# Patient Record
Sex: MALE | Employment: OTHER | URBAN - METROPOLITAN AREA
[De-identification: names, ages, dates, MRNs, and addresses within clinical notes are randomized per-mention and may not be internally consistent; named-entity substitution may affect disease eponyms.]

---

## 2021-08-14 ENCOUNTER — APPOINTMENT (OUTPATIENT)
Dept: GENERAL RADIOLOGY | Age: 81
End: 2021-08-14
Payer: MEDICARE

## 2021-08-14 ENCOUNTER — APPOINTMENT (OUTPATIENT)
Dept: CT IMAGING | Age: 81
End: 2021-08-14
Payer: MEDICARE

## 2021-08-14 ENCOUNTER — HOSPITAL ENCOUNTER (OUTPATIENT)
Age: 81
Setting detail: OBSERVATION
Discharge: HOME | End: 2021-08-16
Attending: STUDENT IN AN ORGANIZED HEALTH CARE EDUCATION/TRAINING PROGRAM | Admitting: INTERNAL MEDICINE
Payer: MEDICARE

## 2021-08-14 DIAGNOSIS — R55 SYNCOPE AND COLLAPSE: Primary | ICD-10-CM

## 2021-08-14 DIAGNOSIS — R77.8 ELEVATED TROPONIN: ICD-10-CM

## 2021-08-14 DIAGNOSIS — Z86.73 HISTORY OF CVA (CEREBROVASCULAR ACCIDENT): ICD-10-CM

## 2021-08-14 PROBLEM — R79.89 ELEVATED TROPONIN: Status: ACTIVE | Noted: 2021-08-14

## 2021-08-14 PROBLEM — Z86.79 HISTORY OF HYPERTENSION: Status: ACTIVE | Noted: 2021-08-14

## 2021-08-14 PROBLEM — R60.0 LOWER EXTREMITY EDEMA: Status: ACTIVE | Noted: 2021-08-14

## 2021-08-14 LAB
ALBUMIN SERPL-MCNC: 3.8 G/DL (ref 3.5–5.2)
ALP BLD-CCNC: 61 U/L (ref 40–129)
ALT SERPL-CCNC: 22 U/L (ref 0–40)
AMORPHOUS: PRESENT
ANION GAP SERPL CALCULATED.3IONS-SCNC: 7 MMOL/L (ref 7–16)
AST SERPL-CCNC: 22 U/L (ref 0–39)
BACTERIA: ABNORMAL /HPF
BASOPHILS ABSOLUTE: 0 E9/L (ref 0–0.2)
BASOPHILS RELATIVE PERCENT: 0.2 % (ref 0–2)
BILIRUB SERPL-MCNC: 0.4 MG/DL (ref 0–1.2)
BILIRUBIN URINE: NEGATIVE
BLOOD, URINE: NEGATIVE
BUN BLDV-MCNC: 15 MG/DL (ref 6–23)
BURR CELLS: ABNORMAL
CALCIUM SERPL-MCNC: 9 MG/DL (ref 8.6–10.2)
CHLORIDE BLD-SCNC: 105 MMOL/L (ref 98–107)
CLARITY: CLEAR
CO2: 30 MMOL/L (ref 22–29)
COLOR: YELLOW
CREAT SERPL-MCNC: 0.8 MG/DL (ref 0.7–1.2)
EOSINOPHILS ABSOLUTE: 0 E9/L (ref 0.05–0.5)
EOSINOPHILS RELATIVE PERCENT: 0.3 % (ref 0–6)
GFR AFRICAN AMERICAN: >60
GFR NON-AFRICAN AMERICAN: >60 ML/MIN/1.73
GLUCOSE BLD-MCNC: 113 MG/DL (ref 74–99)
GLUCOSE URINE: 100 MG/DL
HCT VFR BLD CALC: 46.7 % (ref 37–54)
HEMOGLOBIN: 15 G/DL (ref 12.5–16.5)
KETONES, URINE: NEGATIVE MG/DL
LEUKOCYTE ESTERASE, URINE: NEGATIVE
LYMPHOCYTES ABSOLUTE: 0.67 E9/L (ref 1.5–4)
LYMPHOCYTES RELATIVE PERCENT: 7 % (ref 20–42)
MCH RBC QN AUTO: 28.7 PG (ref 26–35)
MCHC RBC AUTO-ENTMCNC: 32.1 % (ref 32–34.5)
MCV RBC AUTO: 89.5 FL (ref 80–99.9)
MONOCYTES ABSOLUTE: 0.48 E9/L (ref 0.1–0.95)
MONOCYTES RELATIVE PERCENT: 5.2 % (ref 2–12)
NEUTROPHILS ABSOLUTE: 8.36 E9/L (ref 1.8–7.3)
NEUTROPHILS RELATIVE PERCENT: 87.8 % (ref 43–80)
NITRITE, URINE: NEGATIVE
PDW BLD-RTO: 14.4 FL (ref 11.5–15)
PH UA: 8 (ref 5–9)
PLATELET # BLD: 193 E9/L (ref 130–450)
PMV BLD AUTO: 9.8 FL (ref 7–12)
POIKILOCYTES: ABNORMAL
POTASSIUM REFLEX MAGNESIUM: 4.7 MMOL/L (ref 3.5–5)
PRO-BNP: 146 PG/ML (ref 0–450)
PROTEIN UA: NEGATIVE MG/DL
RBC # BLD: 5.22 E12/L (ref 3.8–5.8)
RBC UA: ABNORMAL /HPF (ref 0–2)
SODIUM BLD-SCNC: 142 MMOL/L (ref 132–146)
SPECIFIC GRAVITY UA: 1.02 (ref 1–1.03)
TOTAL PROTEIN: 6.3 G/DL (ref 6.4–8.3)
TROPONIN, HIGH SENSITIVITY: 18 NG/L (ref 0–11)
TROPONIN, HIGH SENSITIVITY: 18 NG/L (ref 0–11)
UROBILINOGEN, URINE: 0.2 E.U./DL
WBC # BLD: 9.5 E9/L (ref 4.5–11.5)
WBC UA: ABNORMAL /HPF (ref 0–5)

## 2021-08-14 PROCEDURE — 71275 CT ANGIOGRAPHY CHEST: CPT

## 2021-08-14 PROCEDURE — 72170 X-RAY EXAM OF PELVIS: CPT

## 2021-08-14 PROCEDURE — 83880 ASSAY OF NATRIURETIC PEPTIDE: CPT

## 2021-08-14 PROCEDURE — 72125 CT NECK SPINE W/O DYE: CPT

## 2021-08-14 PROCEDURE — 81001 URINALYSIS AUTO W/SCOPE: CPT

## 2021-08-14 PROCEDURE — 93005 ELECTROCARDIOGRAM TRACING: CPT | Performed by: STUDENT IN AN ORGANIZED HEALTH CARE EDUCATION/TRAINING PROGRAM

## 2021-08-14 PROCEDURE — 80053 COMPREHEN METABOLIC PANEL: CPT

## 2021-08-14 PROCEDURE — G0378 HOSPITAL OBSERVATION PER HR: HCPCS

## 2021-08-14 PROCEDURE — 71045 X-RAY EXAM CHEST 1 VIEW: CPT

## 2021-08-14 PROCEDURE — 70450 CT HEAD/BRAIN W/O DYE: CPT

## 2021-08-14 PROCEDURE — 36415 COLL VENOUS BLD VENIPUNCTURE: CPT

## 2021-08-14 PROCEDURE — 99284 EMERGENCY DEPT VISIT MOD MDM: CPT

## 2021-08-14 PROCEDURE — 6360000004 HC RX CONTRAST MEDICATION: Performed by: RADIOLOGY

## 2021-08-14 PROCEDURE — 84484 ASSAY OF TROPONIN QUANT: CPT

## 2021-08-14 PROCEDURE — 85025 COMPLETE CBC W/AUTO DIFF WBC: CPT

## 2021-08-14 RX ORDER — ACETAMINOPHEN 650 MG/1
650 SUPPOSITORY RECTAL EVERY 6 HOURS PRN
Status: DISCONTINUED | OUTPATIENT
Start: 2021-08-14 | End: 2021-08-16 | Stop reason: HOSPADM

## 2021-08-14 RX ORDER — ACETAMINOPHEN 325 MG/1
650 TABLET ORAL EVERY 6 HOURS PRN
Status: DISCONTINUED | OUTPATIENT
Start: 2021-08-14 | End: 2021-08-16 | Stop reason: HOSPADM

## 2021-08-14 RX ORDER — SODIUM CHLORIDE 9 MG/ML
25 INJECTION, SOLUTION INTRAVENOUS PRN
Status: DISCONTINUED | OUTPATIENT
Start: 2021-08-14 | End: 2021-08-16 | Stop reason: HOSPADM

## 2021-08-14 RX ORDER — POLYETHYLENE GLYCOL 3350 17 G/17G
17 POWDER, FOR SOLUTION ORAL DAILY PRN
Status: DISCONTINUED | OUTPATIENT
Start: 2021-08-14 | End: 2021-08-16 | Stop reason: HOSPADM

## 2021-08-14 RX ORDER — SODIUM CHLORIDE 0.9 % (FLUSH) 0.9 %
5-40 SYRINGE (ML) INJECTION EVERY 12 HOURS SCHEDULED
Status: DISCONTINUED | OUTPATIENT
Start: 2021-08-14 | End: 2021-08-16 | Stop reason: HOSPADM

## 2021-08-14 RX ORDER — SODIUM CHLORIDE 9 MG/ML
INJECTION, SOLUTION INTRAVENOUS CONTINUOUS
Status: DISCONTINUED | OUTPATIENT
Start: 2021-08-14 | End: 2021-08-16 | Stop reason: HOSPADM

## 2021-08-14 RX ORDER — ONDANSETRON 2 MG/ML
4 INJECTION INTRAMUSCULAR; INTRAVENOUS EVERY 6 HOURS PRN
Status: DISCONTINUED | OUTPATIENT
Start: 2021-08-14 | End: 2021-08-16 | Stop reason: HOSPADM

## 2021-08-14 RX ORDER — SODIUM CHLORIDE 0.9 % (FLUSH) 0.9 %
5-40 SYRINGE (ML) INJECTION PRN
Status: DISCONTINUED | OUTPATIENT
Start: 2021-08-14 | End: 2021-08-16 | Stop reason: HOSPADM

## 2021-08-14 RX ORDER — ONDANSETRON 4 MG/1
4 TABLET, ORALLY DISINTEGRATING ORAL EVERY 8 HOURS PRN
Status: DISCONTINUED | OUTPATIENT
Start: 2021-08-14 | End: 2021-08-16 | Stop reason: HOSPADM

## 2021-08-14 RX ADMIN — IOPAMIDOL 90 ML: 755 INJECTION, SOLUTION INTRAVENOUS at 20:14

## 2021-08-14 NOTE — ED NOTES
Bed: HB  Expected date:   Expected time:   Means of arrival:   Comments:  Dennise Charles RN  08/14/21 9068

## 2021-08-15 ENCOUNTER — APPOINTMENT (OUTPATIENT)
Dept: ULTRASOUND IMAGING | Age: 81
End: 2021-08-15
Payer: MEDICARE
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
## 2021-08-15 LAB
ANION GAP SERPL CALCULATED.3IONS-SCNC: 8 MMOL/L (ref 7–16)
BUN BLDV-MCNC: 11 MG/DL (ref 6–23)
CALCIUM SERPL-MCNC: 8.5 MG/DL (ref 8.6–10.2)
CHLORIDE BLD-SCNC: 104 MMOL/L (ref 98–107)
CO2: 28 MMOL/L (ref 22–29)
CREAT SERPL-MCNC: 0.7 MG/DL (ref 0.7–1.2)
EKG ATRIAL RATE: 72 BPM
EKG P AXIS: 42 DEGREES
EKG P-R INTERVAL: 170 MS
EKG Q-T INTERVAL: 414 MS
EKG QRS DURATION: 84 MS
EKG QTC CALCULATION (BAZETT): 453 MS
EKG R AXIS: 3 DEGREES
EKG T AXIS: 43 DEGREES
EKG VENTRICULAR RATE: 72 BPM
GFR AFRICAN AMERICAN: >60
GFR NON-AFRICAN AMERICAN: >60 ML/MIN/1.73
GLUCOSE BLD-MCNC: 82 MG/DL (ref 74–99)
HCT VFR BLD CALC: 44.5 % (ref 37–54)
HEMOGLOBIN: 14.6 G/DL (ref 12.5–16.5)
MAGNESIUM: 2.1 MG/DL (ref 1.6–2.6)
MCH RBC QN AUTO: 29.1 PG (ref 26–35)
MCHC RBC AUTO-ENTMCNC: 32.8 % (ref 32–34.5)
MCV RBC AUTO: 88.8 FL (ref 80–99.9)
PDW BLD-RTO: 14.4 FL (ref 11.5–15)
PLATELET # BLD: 169 E9/L (ref 130–450)
PMV BLD AUTO: 10.2 FL (ref 7–12)
POTASSIUM REFLEX MAGNESIUM: 3.1 MMOL/L (ref 3.5–5)
RBC # BLD: 5.01 E12/L (ref 3.8–5.8)
SODIUM BLD-SCNC: 140 MMOL/L (ref 132–146)
TROPONIN, HIGH SENSITIVITY: 23 NG/L (ref 0–11)
WBC # BLD: 7.6 E9/L (ref 4.5–11.5)

## 2021-08-15 PROCEDURE — 85027 COMPLETE CBC AUTOMATED: CPT

## 2021-08-15 PROCEDURE — G0378 HOSPITAL OBSERVATION PER HR: HCPCS

## 2021-08-15 PROCEDURE — 97165 OT EVAL LOW COMPLEX 30 MIN: CPT

## 2021-08-15 PROCEDURE — 93970 EXTREMITY STUDY: CPT

## 2021-08-15 PROCEDURE — 96372 THER/PROPH/DIAG INJ SC/IM: CPT

## 2021-08-15 PROCEDURE — 93010 ELECTROCARDIOGRAM REPORT: CPT | Performed by: INTERNAL MEDICINE

## 2021-08-15 PROCEDURE — 6360000002 HC RX W HCPCS: Performed by: NURSE PRACTITIONER

## 2021-08-15 PROCEDURE — 36415 COLL VENOUS BLD VENIPUNCTURE: CPT

## 2021-08-15 PROCEDURE — 2580000003 HC RX 258: Performed by: NURSE PRACTITIONER

## 2021-08-15 PROCEDURE — 6370000000 HC RX 637 (ALT 250 FOR IP): Performed by: INTERNAL MEDICINE

## 2021-08-15 PROCEDURE — 80048 BASIC METABOLIC PNL TOTAL CA: CPT

## 2021-08-15 PROCEDURE — 97530 THERAPEUTIC ACTIVITIES: CPT

## 2021-08-15 PROCEDURE — 84484 ASSAY OF TROPONIN QUANT: CPT

## 2021-08-15 PROCEDURE — 83735 ASSAY OF MAGNESIUM: CPT

## 2021-08-15 RX ORDER — POTASSIUM CHLORIDE 20 MEQ/1
40 TABLET, EXTENDED RELEASE ORAL ONCE
Status: COMPLETED | OUTPATIENT
Start: 2021-08-15 | End: 2021-08-15

## 2021-08-15 RX ADMIN — Medication 10 ML: at 10:56

## 2021-08-15 RX ADMIN — Medication 10 ML: at 02:43

## 2021-08-15 RX ADMIN — Medication 10 ML: at 20:20

## 2021-08-15 RX ADMIN — SODIUM CHLORIDE: 9 INJECTION, SOLUTION INTRAVENOUS at 02:42

## 2021-08-15 RX ADMIN — ENOXAPARIN SODIUM 40 MG: 40 INJECTION SUBCUTANEOUS at 10:56

## 2021-08-15 RX ADMIN — POTASSIUM CHLORIDE 40 MEQ: 1500 TABLET, EXTENDED RELEASE ORAL at 17:36

## 2021-08-15 ASSESSMENT — PAIN SCALES - GENERAL: PAINLEVEL_OUTOF10: 0

## 2021-08-15 NOTE — H&P
[id number]  228[facility]  History and Physical      CHIEF COMPLAINT:    Chief Complaint   Patient presents with    Fall     pt. fell at home, +LOC, unsure if passed out prior to falling or after falling, lasted aprx 1min. No thinners no signs of trauma         Patient of Amrita Lozano DO presents with:  Syncope and collapse    History of Present Illness:   Patient states that he spent a lot of time working outdoors yesterday in the hot sun spraying weed killer. He felt okay yesterday evening. Overnight, he got up and went to the bathroom. He is not sure if he was going to or coming back from the bathroom but he just remembers that he fell. He is not sure if he lost consciousness and that he does not recall any prodromal symptoms such as dizziness lightheadedness chest pain or shortness of breath. He does not get any dizziness or lightheadedness when he stands. He denies any recent medication changes. He denies any fevers or chills. Denies diarrhea. He is chronically unsteady on his feet. He states that has not been any worse recently but he does use a cane as well as a walker at home. REVIEW OF SYSTEMS:  Pertinent negatives are above in HPI. 10 point ROS otherwise negative. History reviewed. No pertinent past medical history. History reviewed. No pertinent surgical history. Medications Prior to Admission:    No medications prior to admission. Note that the patient's home medications were reviewed and the above list is accurate to the best of my knowledge at the time of the exam.    Allergies:    Patient has no known allergies.     Social History:   No etoh or smoking    Family History:   No fhx syncope      PHYSICAL EXAM:    Vitals:  /69   Pulse 64   Temp 97.3 °F (36.3 °C) (Tympanic)   Resp 16   Ht 5' 11\" (1.803 m)   Wt 191 lb 12.8 oz (87 kg)   SpO2 95%   BMI 26.75 kg/m²       General appearance: NAD, conversant  Eyes: Sclerae anicteric, PERRLA  HEENT: AT/NC, personally reviewed the patient's telemetry:  NSR PVC's no major arrhythmias. HR sometimes dips to high 40's overnight. ASSESSMENT/PLAN:  Principal Problem:    Fall  Active Problems:    History of CVA (cerebrovascular accident)    History of hypertension    Elevated troponin  Resolved Problems:    * No resolved hospital problems. *      Not sure if syncopized or just fell    His gait is very poor and he's quite deconditioned so my main suspicion is he fell. His neuro exam is nonfocal.  He couldn't even sit up out of bed without help and was hanging onto the side of the bed for support. He doesn't have good recollection of the event    PT    He is really anxious to go home but I want to make sure he's safe first    TTE has been ordered, however, I highly doubt he will get it during this admission due to understaffing / overwhelmed echo department. I think reasonable to do it as outpatient. I'm not hearing any murmur on exam or other findings to suggest structural cardiac disease. Due to his moderate bradycardia, I will recommend ZioPatch as outpatient.     Code status: Full  Requires obs level of care  Mick Vizcarra MD    8:00 AM  8/15/2021

## 2021-08-15 NOTE — ED PROVIDER NOTES
contributory    The patients home medications have been reviewed. Allergies: Patient has no known allergies. I have reviewed the past medical history, past surgical history, social history, and family history    ---------------------------------------------------PHYSICAL EXAM--------------------------------------    General: [The patient is conversational and lying comfortably in bed.]  Head: [Normocephalic and atraumatic.]  Eyes: [Sclera non-icteric and no conjunctival injection]  ENT: [Nasal and oral membranes moist]  Neck: [Trachea midline]. [No JVD]  Respiratory: [Lungs clear to auscultation bilaterally.] [Patient speaking in full sentences.]  Cardiovascular: [Regular rate.] [No pedal edema.]  Gastrointestinal:  Abdomen is soft and nondistended. Bowel sounds present. There is no tenderness. There is no guarding or rebound. Musculoskeletal: No deformity. No tenderness of the lower extremities. No tenderness of the calf. No visible asymmetry of the calves. Skin: Skin warm and dry. [No rashes.]   Neurologic: [No gross motor deficits.] [No aphasia.]  Pupils are equal and reactive to light. Extraocular eye movements intact. Sensation intact bilaterally. Symmetric facies. Tongue protrudes midline. 5 out of 5 symmetric strength of the upper and lower extremities. Slow finger-nose testing. Patient does have somewhat slurred speech but he states this is at baseline. He has had a prior stroke. Alert and oriented. Psychiatric: [Normal affect.]    -------------------------------------------------- RESULTS -------------------------------------------------  I have personally reviewed all laboratory and imaging results for this patient. Results are listed below.      LABS: (Lab results interpreted by me)  Results for orders placed or performed during the hospital encounter of 08/14/21   Comprehensive Metabolic Panel w/ Reflex to MG   Result Value Ref Range    Sodium 142 132 - 146 mmol/L    Potassium reflex Magnesium 4.7 3.5 - 5.0 mmol/L    Chloride 105 98 - 107 mmol/L    CO2 30 (H) 22 - 29 mmol/L    Anion Gap 7 7 - 16 mmol/L    Glucose 113 (H) 74 - 99 mg/dL    BUN 15 6 - 23 mg/dL    CREATININE 0.8 0.7 - 1.2 mg/dL    GFR Non-African American >60 >=60 mL/min/1.73    GFR African American >60     Calcium 9.0 8.6 - 10.2 mg/dL    Total Protein 6.3 (L) 6.4 - 8.3 g/dL    Albumin 3.8 3.5 - 5.2 g/dL    Total Bilirubin 0.4 0.0 - 1.2 mg/dL    Alkaline Phosphatase 61 40 - 129 U/L    ALT 22 0 - 40 U/L    AST 22 0 - 39 U/L   CBC Auto Differential   Result Value Ref Range    WBC 9.5 4.5 - 11.5 E9/L    RBC 5.22 3.80 - 5.80 E12/L    Hemoglobin 15.0 12.5 - 16.5 g/dL    Hematocrit 46.7 37.0 - 54.0 %    MCV 89.5 80.0 - 99.9 fL    MCH 28.7 26.0 - 35.0 pg    MCHC 32.1 32.0 - 34.5 %    RDW 14.4 11.5 - 15.0 fL    Platelets 783 962 - 724 E9/L    MPV 9.8 7.0 - 12.0 fL    Neutrophils % 87.8 (H) 43.0 - 80.0 %    Lymphocytes % 7.0 (L) 20.0 - 42.0 %    Monocytes % 5.2 2.0 - 12.0 %    Eosinophils % 0.3 0.0 - 6.0 %    Basophils % 0.2 0.0 - 2.0 %    Neutrophils Absolute 8.36 (H) 1.80 - 7.30 E9/L    Lymphocytes Absolute 0.67 (L) 1.50 - 4.00 E9/L    Monocytes Absolute 0.48 0.10 - 0.95 E9/L    Eosinophils Absolute 0.00 (L) 0.05 - 0.50 E9/L    Basophils Absolute 0.00 0.00 - 0.20 E9/L    Poikilocytes 2+     [place] Cells 2+    Troponin   Result Value Ref Range    Troponin, High Sensitivity 18 (H) 0 - 11 ng/L   Brain Natriuretic Peptide   Result Value Ref Range    Pro- 0 - 450 pg/mL   Urinalysis with Microscopic   Result Value Ref Range    Color, UA Yellow Straw/Yellow    Clarity, UA Clear Clear    Glucose, Ur 100 (A) Negative mg/dL    Bilirubin Urine Negative Negative    Ketones, Urine Negative Negative mg/dL    Specific Gravity, UA 1.020 1.005 - 1.030    Blood, Urine Negative Negative    pH, UA 8.0 5.0 - 9.0    Protein, UA Negative Negative mg/dL    Urobilinogen, Urine 0.2 <2.0 E.U./dL    Nitrite, Urine Negative Negative    Leukocyte Esterase, Urine Negative Negative    WBC, UA 0-1 0 - 5 /HPF    RBC, UA NONE 0 - 2 /HPF    Bacteria, UA RARE (A) None Seen /HPF    Amorphous, UA PRESENT    Troponin   Result Value Ref Range    Troponin, High Sensitivity 18 (H) 0 - 11 ng/L   EKG 12 Lead   Result Value Ref Range    Ventricular Rate 72 BPM    Atrial Rate 72 BPM    P-R Interval 170 ms    QRS Duration 84 ms    Q-T Interval 414 ms    QTc Calculation (Bazett) 453 ms    P Axis 42 degrees    R Axis 3 degrees    T Axis 43 degrees   ,     RADIOLOGY:  Interpreted by Radiologist unless otherwise specified  CT HEAD WO CONTRAST   Final Result   No acute intracranial abnormality. Probable bilateral thalamic nuclei lacunar infarcts. Periventricular white matter changes consistent chronic microvascular   disease. Diffuse volume loss. CT CERVICAL SPINE WO CONTRAST   Final Result   No acute abnormality of the cervical spine. Degenerative changes C4 through C7. CTA PULMONARY W CONTRAST   Final Result   No evidence of pulmonary embolism or acute pulmonary abnormality. XR PELVIS (1-2 VIEWS)   Final Result   No acute abnormality of the pelvis. XR CHEST PORTABLE   Final Result   No acute process. Bibasilar atelectasis. US DUP LOWER EXTREMITIES BILATERAL VENOUS    (Results Pending)       ------------------------- NURSING NOTES AND VITALS REVIEWED ---------------------------   The nursing notes within the ED encounter and vital signs as below have been reviewed by myself  BP (!) 154/75   Pulse 61   Temp 97.4 °F (36.3 °C) (Temporal)   Resp 16   Ht 5' 11\" (1.803 m)   Wt 195 lb (88.5 kg)   SpO2 94%   BMI 27.20 kg/m²     Oxygen Saturation Interpretation: Abnormal    The patients available past medical records and past encounters were reviewed.       ------------------------------ ED COURSE/MEDICAL DECISION MAKING----------------------  Medications   sodium chloride flush 0.9 % injection 5-40 mL (has no administration in time range)   sodium chloride flush 0.9 % injection 5-40 mL (has no administration in time range)   0.9 % sodium chloride infusion (has no administration in time range)   enoxaparin (LOVENOX) injection 40 mg (has no administration in time range)   ondansetron (ZOFRAN-ODT) disintegrating tablet 4 mg (has no administration in time range)     Or   ondansetron (ZOFRAN) injection 4 mg (has no administration in time range)   polyethylene glycol (GLYCOLAX) packet 17 g (has no administration in time range)   acetaminophen (TYLENOL) tablet 650 mg (has no administration in time range)     Or   acetaminophen (TYLENOL) suppository 650 mg (has no administration in time range)   0.9 % sodium chloride infusion (has no administration in time range)   perflutren lipid microspheres (DEFINITY) injection 1.65 mg (has no administration in time range)   iopamidol (ISOVUE-370) 76 % injection 90 mL (90 mLs Intravenous Given 8/14/21 2014)       Medical Decision Making: This is a 78 y.o. male presenting to the ED for possible syncope. On initial presentation, the patient's vital signs are interpreted as normotension, afebrile and slightly hypoxic. Based on history and physical exam, we have a broad differential.  We are concerned for ACS, arrhythmia, or intracranial process such as hemorrhage. The patient's neurological exam is nonfocal.  He does have history of prior stroke. As he is mildly hypoxic and endorses asymmetry of his lower extremities he also considered PE and cannot exclude it. Will obtain chest x-ray, EKG, CT PE and laboratory studies. The patient has no complaints at this time. No evidence of trauma however as he lost consciousness and is on aspirin with questionable hitting of his head he does require CT of the head. Will obtain CT of the cervical spine based on his age and Nexus C-spine criteria. Pelvic x-ray will also be obtained as the patient has not yet ambulated.     A 12-lead EKG was performed and interpreted by myself. The rate is 72 with [normal sinus rhythm] and [normal axis]. The patient has PVCs and PACs noted. The LA interval is 170, QRS interval is 84, and QTC interval is 453. The patient has no acute ST elevations or depressions. This is sinus rhythm with PVCs, PACs and nonspecific abnormality. Laboratory studies show mildly elevated bicarb of 30. Electrolytes otherwise within normal limits. Troponin is found to be 18. The patient does not have a leukocytosis or anemia. LFTs within normal limits. Urinalysis shows no evidence of urinary tract infection. Chest x-ray shows no acute process. Bibasilar atelectasis. Pelvic x-ray shows no acute abnormality. CT PE shows no pulmonary embolism or acute pulmonary abnormality. CT of the cervical spine shows no acute abnormality. Degenerative disease of C4-7. CT of the head shows no acute intracranial abnormality. Probable bilateral thalamic nuclei lacunar infarcts. Periventricular white matter changes consistent with chronic microvascular disease. He is volume loss. This is interpreted by radiology. The patient is resting comfortable. His c-collar is cleared. He has no complaints. As he had a syncopal episode and no prior cardiac work-up in our system we do feel he requires admission. His primary care physician is Dr. Maya Salcido. We spoke with the admitting team and they are agreeable to the admission. Patient verbalies understanding and is agreeable to the plan. This patient's ED course included: a personal history and physicial examination and re-evaluation prior to disposition    This patient has remained hemodynamically stable during their ED course. Consultations:  [None]    The cardiac monitor revealed sinus rhythm with a heart rate in the 60s as interpreted by me. The cardiac monitor was ordered secondary to the patient's syncope and to monitor the patient for dysrhythmia.    CPT 68015    Oxygen Saturation Interpretation: 94 % on room air. Abnormal    Counseling:   I have spoken with the patient and discussed todays results, in addition to providing specific details for the plan of care and counseling regarding the diagnosis and prognosis. Questions are answered at this time and they are agreeable with the plan.     --------------------------------- IMPRESSION AND DISPOSITION ---------------------------------    IMPRESSION  1. Syncope and collapse    2. Elevated troponin    3. History of CVA (cerebrovascular accident)        DISPOSITION  Disposition: Admit to telemetry  Patient condition is fair    I, Dr. Lory Niño, am the primary provider of record    NOTE: This report was transcribed using voice recognition software.  Every effort was made to ensure accuracy; however, inadvertent computerized transcription errors may be present      Lory Niño MD  08/15/21 0050

## 2021-08-15 NOTE — PROGRESS NOTES
[address]  [address]                                                Patient Name: Terrell Swan  MRN: [id number]  : 1940  Room: [address] [place], [place] [id number]    Referring Provider: LINNETTE Huddleston CNP  Specific Provider Orders/Date: OT eval and treat 21    Diagnosis: Syncope and collapse [R55]   Pt admitted to hospital on 21 following fall, +LOC    Pertinent Medical History:  has no past medical history on file.        Precautions:  Fall Risk, bed alarm    Assessment of current deficits    [x] Functional mobility  [x]ADLs  [x] Strength               [x]Cognition    [x] Functional transfers   [x] IADLs         [x] Safety Awareness   [x]Endurance    [] Fine Coordination              [x] Balance      [] Vision/perception   []Sensation     []Gross Motor Coordination  [] ROM  [] Delirium                   [] Motor Control     OT PLAN OF CARE   OT POC based on physician orders, patient diagnosis and results of clinical assessment    Frequency/Duration 1-3 days/wk for 2 weeks PRN   Specific OT Treatment Interventions to include:   * Instruction/training on adapted ADL techniques and AE recommendations to increase functional independence within precautions       * Training on energy conservation strategies, correct breathing pattern and techniques to improve independence/tolerance for self-care routine  * Functional transfer/mobility training/DME recommendations for increased independence, safety, and fall prevention  * Patient/Family education to increase follow through with safety techniques and functional independence  * Recommendation of environmental modifications for increased safety with functional transfers/mobility and ADLs  * Cognitive retraining/development of therapeutic activities to improve problem solving, judgement, memory, and attention for increased safety/participation in ADL/IADL tasks  * Therapeutic exercise to improve motor endurance, ROM, and functional strength for ADLs/functional transfers  * Therapeutic activities to facilitate/challenge dynamic balance, stand tolerance for increased safety and independence with ADLs  * Therapeutic activities to facilitate gross/fine motor skills for increased independence with ADLs    Recommended Adaptive Equipment: TBD     Home Living: Pt lives with spouse in 1 floor home.  2 + 1 ALY, 1+1 handrail  Basement laundry - spouse responsible for   Bathroom setup: walk in shower with grab bars    Equipment owned: w/w, SPC    Prior Level of Function: independent/mod I with ADLs , shares IADLs; ambulated w/ w/w vs SPC   Driving: yes, short distances    Pain Level: Pt c/o no pain this session    Cognition: A&O: 3/4; Follows1 step directions  Delayed response time   Memory:  fair    Sequencing:  fair    Problem solving:  fair    Judgement/safety:  fair      Functional Assessment:  AM-PAC Daily Activity Raw Score: 16/24   Initial Eval Status  Date: 8/15/21 Treatment Status  Date: STGs = LTGs  Time frame: 10-14 days   Feeding Supervision      Grooming Minimal Assist   Modified [place]    UB Dressing Stand by Assist   Modified [place]    LB Dressing Moderate Assist   Stand by Assist    Bathing Moderate Assist  Simulated  Stand by Assist    Toileting Minimal Assist   Supervision    Bed Mobility  Supine to sit: Moderate Assist   Sit to supine: Minimal Assist   Supine to sit: Supervision   Sit to supine: Supervision    Functional Transfers Minimal Assist   Supervision    Functional Mobility Minimal Assist w/ w/w  Light mobility in room to prep for bathrm mobility  Limited by c/o mild lightheadedness  Supervision    Balance Sitting:     Static:  Supervision    Dynamic: SBA  Standing: Min A w/ w/w     Activity Tolerance Fair-  Good   Visual/  Perceptual Glasses: no  [facility] Hand Dominance R   AROM (PROM) Strength Additional Info:    RUE  WFL 4/5 good  and wfl FMC/dexterity noted during ADL tasks       LUE WFL 4/5 good  and wfl FMC/dexterity noted during ADL tasks       Hearing: WFL  Sensation:  No c/o numbness or tingling  Tone: WFL  Edema: none noted    Comments: Upon arrival patient lying in bed. Pt agreeable to OT session this date. Therapist educated pt on role of OT. At end of session, patient lying in bed (bed alarm on) with call light and phone within reach, all lines and tubes intact. Overall patient demonstrated decreased independence and safety during completion of ADL/functional transfer/mobility tasks. Pt would benefit from continued skilled OT to increase safety and independence with completion of ADL/IADL tasks for functional independence and quality of life. Treatment: OT treatment provided this date includes: Facilitation of bed mobility (education/cues for body mechanics), unsupported sitting balance (addressing posture, weight shifting, dynamic reaching to prep for ADL's), functional transfers (various surfaces w/ education/cues for safety/hand placement), standing tolerance tasks (addressing posture, balance and activity tolerance while incorporating light functional reaching impacting ADLs and functional activity) and light functional ambulation tasks with w/w (to prep for bathroom mobility and for item retrieval tasks w/ education/cuing on posture, w/w management, attention and safety). Therapist facilitated self-care retraining: UB/LB self-care tasks, simulated toileting task and standing grooming tasks while educating/cuing pt on modified techniques, posture, safety and energy conservation techniques. Skilled monitoring of HR, O2 sats and pts response to treatment. Pt c/o mild lightheadedness w/ functional transfers and light ambulation task. Subsided w/ seated rest breaks. Safety reinforced.   BP:  Supine: 120/68, HR=67 bpm  Seated: 121/65  Standin/75    Rehab Potential: Good for established goals     Patient / Family Goal: to return home      Patient and/or family were instructed on functional diagnosis, prognosis/goals and OT plan of care. Demonstrated fair understanding. Eval Complexity: Low    Time In: 11:29  Time Out: 11:54  Total Treatment Time: 10 minutes    Min Units   OT Eval Low 97165  X  1   OT Eval Medium 48964      OT Eval High 18548      OT Re-Eval [id number]       Therapeutic Ex 78874       Therapeutic Activities 36884  10  1   ADL/Self Care 72325       Orthotic Management 14849       Manual [id number]     Neuro Re-Ed 80442       Non-Billable Time          Evaluation Time additionally includes thorough review of current medical information, gathering information on past medical history/social history and prior level of function, interpretation of standardized testing/informal observation of tasks, assessment of data and development of plan of care and goals.         RASHI LarsonR/L #[id number]

## 2021-08-15 NOTE — PROGRESS NOTES
Paged Dr. Tena Balloon to let him know that occupation therapy saw patient; however, physical therapy has not and unsure if they will be seeing him today. Also notified him of potassium 3.1. New orders received for potassium. Patient will stay over night so that patient may be evaluated by physical therapy tomorrow. Family and patient updated.

## 2021-08-15 NOTE — ED NOTES
Pt has urinary incontinence and is refusing to change his pants until he leaves, stating that he is just going to do it again.       Shira Serrano RN  08/14/21 2112

## 2021-08-16 VITALS
RESPIRATION RATE: 16 BRPM | HEART RATE: 74 BPM | HEIGHT: 71 IN | BODY MASS INDEX: 25.99 KG/M2 | SYSTOLIC BLOOD PRESSURE: 103 MMHG | WEIGHT: 185.63 LBS | OXYGEN SATURATION: 93 % | DIASTOLIC BLOOD PRESSURE: 62 MMHG | TEMPERATURE: 97.7 F

## 2021-08-16 LAB
ANION GAP SERPL CALCULATED.3IONS-SCNC: 8 MMOL/L (ref 7–16)
BASOPHILS ABSOLUTE: 0.05 E9/L (ref 0–0.2)
BASOPHILS RELATIVE PERCENT: 0.6 % (ref 0–2)
BUN BLDV-MCNC: 9 MG/DL (ref 6–23)
CALCIUM SERPL-MCNC: 8.5 MG/DL (ref 8.6–10.2)
CHLORIDE BLD-SCNC: 106 MMOL/L (ref 98–107)
CO2: 25 MMOL/L (ref 22–29)
CREAT SERPL-MCNC: 0.7 MG/DL (ref 0.7–1.2)
EOSINOPHILS ABSOLUTE: 0.25 E9/L (ref 0.05–0.5)
EOSINOPHILS RELATIVE PERCENT: 3.1 % (ref 0–6)
GFR AFRICAN AMERICAN: >60
GFR NON-AFRICAN AMERICAN: >60 ML/MIN/1.73
GLUCOSE BLD-MCNC: 92 MG/DL (ref 74–99)
HCT VFR BLD CALC: 49 % (ref 37–54)
HEMOGLOBIN: 16.2 G/DL (ref 12.5–16.5)
IMMATURE GRANULOCYTES #: 0.05 E9/L
IMMATURE GRANULOCYTES %: 0.6 % (ref 0–5)
LV EF: 60 %
LVEF MODALITY: NORMAL
LYMPHOCYTES ABSOLUTE: 1.08 E9/L (ref 1.5–4)
LYMPHOCYTES RELATIVE PERCENT: 13.6 % (ref 20–42)
MCH RBC QN AUTO: 29.6 PG (ref 26–35)
MCHC RBC AUTO-ENTMCNC: 33.1 % (ref 32–34.5)
MCV RBC AUTO: 89.4 FL (ref 80–99.9)
MONOCYTES ABSOLUTE: 0.68 E9/L (ref 0.1–0.95)
MONOCYTES RELATIVE PERCENT: 8.6 % (ref 2–12)
NEUTROPHILS ABSOLUTE: 5.84 E9/L (ref 1.8–7.3)
NEUTROPHILS RELATIVE PERCENT: 73.5 % (ref 43–80)
PDW BLD-RTO: 14.4 FL (ref 11.5–15)
PLATELET # BLD: 189 E9/L (ref 130–450)
PMV BLD AUTO: 10.5 FL (ref 7–12)
POTASSIUM SERPL-SCNC: 3.6 MMOL/L (ref 3.5–5)
RBC # BLD: 5.48 E12/L (ref 3.8–5.8)
SODIUM BLD-SCNC: 139 MMOL/L (ref 132–146)
WBC # BLD: 8 E9/L (ref 4.5–11.5)

## 2021-08-16 PROCEDURE — 97161 PT EVAL LOW COMPLEX 20 MIN: CPT | Performed by: PHYSICAL THERAPIST

## 2021-08-16 PROCEDURE — G0378 HOSPITAL OBSERVATION PER HR: HCPCS

## 2021-08-16 PROCEDURE — 80048 BASIC METABOLIC PNL TOTAL CA: CPT

## 2021-08-16 PROCEDURE — 96372 THER/PROPH/DIAG INJ SC/IM: CPT

## 2021-08-16 PROCEDURE — 2580000003 HC RX 258: Performed by: NURSE PRACTITIONER

## 2021-08-16 PROCEDURE — 36415 COLL VENOUS BLD VENIPUNCTURE: CPT

## 2021-08-16 PROCEDURE — 93306 TTE W/DOPPLER COMPLETE: CPT

## 2021-08-16 PROCEDURE — 85025 COMPLETE CBC W/AUTO DIFF WBC: CPT

## 2021-08-16 PROCEDURE — 6360000002 HC RX W HCPCS: Performed by: NURSE PRACTITIONER

## 2021-08-16 RX ADMIN — ENOXAPARIN SODIUM 40 MG: 40 INJECTION SUBCUTANEOUS at 08:42

## 2021-08-16 RX ADMIN — Medication 10 ML: at 08:43

## 2021-08-16 ASSESSMENT — PAIN SCALES - GENERAL: PAINLEVEL_OUTOF10: 0

## 2021-08-16 NOTE — CARE COORDINATION
Discharge order noted. Spouse and pt are aware. Pt lives with spouse in a 2 step to enter 1 story home. grabbars throughout the home. eugenia hx with [facility]. hh hx with [facility]. Outpatient therapy hx with [facility]. ampa score of 18/24, pt is refusing home health. Owns walkers and canes. Discussed with spouse, she will talk with pt after he discharges and follow up with pcp if need an order for outpatient therapy. No other needs. Daughter and spouse to transport home.      Sindy, Jeanette [facility]

## 2021-08-16 NOTE — CARE COORDINATION
8/16/21 Transition of Care: Patient is alert. He is observation. He resides with his spouse and has a wheeled walker and cane at home. He does have a shower with grab bars. His pcp is Dr Ozzy Hernández and pharmacy is mail order. Plan is for patient to return home today after therapy evaluation is completed. Rehab called and notified of need for PT eval for discharge.  Johnson Elliott RN CM

## 2021-08-16 NOTE — DISCHARGE SUMMARY
Physician Discharge Summary     Patient ID:  Ghazala Kirkpatrick  [id number]  78 y.o.  1940    Admit date: 8/14/2021    Discharge date and time:  8/16/2021     Admission Diagnoses:   Chief Complaint   Patient presents with    Fall     pt. fell at home, +LOC, unsure if passed out prior to falling or after falling, lasted aprx 1min. No thinners no signs of trauma       Fall     Discharge Diagnoses:   Principal Problem:    Fall  Active Problems:    History of CVA (cerebrovascular accident)    History of hypertension    Elevated troponin  Resolved Problems:    * No resolved hospital problems. *       Consults: None    Procedures: None    Hospital Course:   Patient presented with a fall when he got up to go to the restroom in the evening, questionable syncope. Here in the hospital, he was noted to occasionally have mild bradycardia into the 40s at night but nothing really concerning such as high-grade block. His labs were unremarkable, as were his vitals. The most prominent finding is just that he is quite unsteady on his feet and seems pretty deconditioned. For example I had to use all my strength to help him even sit up in bed, and then once I got him walking he was holding onto the bed for support. This seems to be more of a longstanding issue. He will go home with home therapy. An echocardiogram was ordered but could not be done in a timely fashion due to chronic staffing issues here in the hospital.  I do not think it will affect near-term management so I have reordered that as an outpatient study. Also due to the mild bradycardia, I have ordered a Zio patch for outpatient.      Discharge Exam:  Vitals:    08/15/21 2004 08/15/21 2021 08/16/21 0551 08/16/21 0836   BP: (!) 146/89 132/70  103/62   Pulse: 101 61  74   Resp: 16 18  16   Temp: 97.9 °F (36.6 °C)   97.7 °F (36.5 °C)   TempSrc: Temporal   Temporal   SpO2: 94%   93%   Weight:   185 lb 10 oz (84.2 kg)    Height:            General appearance: NAD, conversant  HEENT: AT/NC, MMM  Neck: FROM, supple  Lungs: Clear to auscultation, WOB normal  CV: RRR, no MRGs  Abdomen: Soft, non-tender; no masses or HSM, +BS  Extremities: No peripheral edema or digital cyanosis  Skin: no rash, lesions or ulcers  Psych: Calm and cooperative  Neuro: Alert and interactive, nonfocal     Condition:  Stable    Disposition: home    Patient Instructions: There are no discharge medications for this patient. Activity: activity as tolerated  Diet: regular diet    Follow-up with PCP in 1 week.     Signed:  Noel Valle MD    8/16/2021  12:56 PM

## 2021-08-16 NOTE — PROGRESS NOTES
Physical Therapy  Physical Therapy Initial Assessment     Name: Rudy Zabala  : 1940  MRN: [id number]      Date of Service: 2021    Evaluating PT:  Patrick Oliver, PT, DPT  [id number]      Room #:  8210/8210-B  Diagnosis:  Syncope and collapse [R55]  PMHx/PSHx:  None listed in EMR  Procedure/Surgery:  None this admission  Precautions:  Falls  Equipment Needs:  Pt owns necessary DME    SUBJECTIVE:    Pt lives with his wife in a single story house with 2+1 stairs to enter and 1+1 rails. Bed is on first floor and bath is on first floor. Pt ambulated with SPC primarily and uses FWW \"when I'm in a hurry\" PTA. OBJECTIVE:   Initial Evaluation  Date: 21 Treatment Short Term/ Long Term   Goals   AM-PAC 6 Clicks      Was pt agreeable to Eval/treatment? yes     Does pt have pain? No c/o pain     Bed Mobility  Rolling: SBA  Supine to sit: Thea  Sit to supine: NT  Scooting: SBA  Rolling: Mod Independent   Supine to sit: Mod Independent   Sit to supine: Mod Independent   Scooting: Mod Independent    Transfers Sit to stand: Thea  Stand to sit: Thea  Stand pivot: Thea with FWW  Sit to stand: Mod Independent   Stand to sit: Mod Independent   Stand pivot: Mod Independent    Ambulation    150 feet with FWW with Thea  >300 feet with AAD with Mod Independent    Stair negotiation: ascended and descended  3 steps with 1 rail Thea  3 steps with 1 rail Mod Independent    ROM BUE:  WNL  BLE:  WNL     Strength BUE:  WNL  BLE:  4/5     Balance Sitting EOB:  SBA  Dynamic Standing:  Thea with FWW  Sitting EOB:  Independent   Dynamic Standing:   Mod Independent with AAD     Pt is A & O x 3  Sensation:  Pt denies numbness and tingling to extremities  Edema:  unremarkable    Patient education  Pt educated on role of therapy, safety with mobility, transfer technique, safety with stairs/amb    Patient response to education:   Pt verbalized understanding Pt demonstrated skill Pt requires further education in this area   yes yes yes ASSESSMENT:    Conditions Requiring Skilled Therapeutic Intervention:    [x]Decreased strength     []Decreased ROM  [x]Decreased functional mobility  [x]Decreased balance   [x]Decreased endurance   [x]Decreased posture  [x]Decreased sensation  [x]Decreased coordination   []Decreased vision  [x]Decreased safety awareness   []Increased pain       Comments:  Pt resting semi-supine upon arrival, agreeable to PT eval. Pt completed bed mobility with rocking of trunk and kicking forward of feet, unsuccessfully attempted x3, manual assist provided from therapist for trunk lift to achieve upright sitting on EOB. Pt denies c/o dizziness with all postural changes this date. He required cues for safe hand placement and light balance assist during sit>stand. Manual assist for improved eccentric control during stand>sit with max cues for hand placement. Pt amb with slight unsteadiness and required cues for improved walker placement to avoid kicking back leg of walker during swing phase. Pt required manual assist for balance during stairs with exaggerated lateral sway to L towards rail during descent. Pt seated in chair upon completion of session with all needs in reach. He will benefit from short course of skilled PT for emphasis on safety, balance, and to restore independence with mobility. Treatment:  Patient practiced and was instructed in the following treatment:     Bed mobility: cues for sequencing and technique, manual assist for trunk lift   Transfer training: cues for hand placement, manual assist for balance and eccentric control   Gait training: cues for walker placement, manual assist for balance throughout amb on level surfaces and stairs. Pt's/ family goals   1. To return home independently    Prognosis is good for reaching above PT goals. Patient and or family understand(s) diagnosis, prognosis, and plan of care.   yes    PHYSICAL THERAPY PLAN OF CARE:    PT POC is established based on physician order and patient diagnosis     Referring provider/PT Order:    08/14/21 2330  PT eval and treat Start: 08/14/21 2330, End: 08/14/21 2330, ONE TIME, Standing Count: 1 Occurrences, R      April Letitia, APRN - CNP     Diagnosis:  Syncope and collapse [R55]  Specific instructions for next treatment:  Progress mobility as tolerated, emphasis on safety awareness    Current Treatment Recommendations:     [x] Strengthening to improve independence with functional mobility   [] ROM to improve independence with functional mobility   [x] Balance Training to improve static/dynamic balance and to reduce fall risk  [x] Endurance Training to improve activity tolerance during functional mobility   [x] Transfer Training to improve safety and independence with all functional transfers   [x] Gait Training to improve gait mechanics, endurance and asses need for appropriate assistive device  [x] Stair Training in preparation for safe discharge home and/or into the community   [x] Positioning to prevent skin breakdown and contractures  [x] Safety and Education Training   [x] Patient/Caregiver Education   [] HEP  [] Other     PT long term treatment goals are located in above grid    Frequency of treatments: 2-5x/week x 1-2 weeks. Time in  1100  Time out  1115    Total Treatment Time  0 minutes     Evaluation Time includes thorough review of current medical information, gathering information on past medical history/social history and prior level of function, completion of standardized testing/informal observation of tasks, assessment of data and education on plan of care and goals.     CPT codes:  [x] Low Complexity PT evaluation 39668  [] Moderate Complexity PT evaluation 62303  [] High Complexity PT evaluation 19642  [] PT Re-evaluation 19202  [] Gait training 02355 0 minutes  [] Manual therapy 20270 0 minutes  [] Therapeutic activities 90493 0 minutes  [] Therapeutic exercises 94347 0 minutes  [] Neuromuscular reeducation 20080 0 minutes     Adelfo Britton, PT, DPT  [id number]

## 2021-08-17 ENCOUNTER — TELEPHONE (OUTPATIENT)
Dept: ADMINISTRATIVE | Age: 81
End: 2021-08-17

## 2021-08-17 NOTE — TELEPHONE ENCOUNTER
Patient will contact pcp do get further evaluation.  Patient did not have a Cardiology workup in the hospital.

## 2021-08-17 NOTE — TELEPHONE ENCOUNTER
Pt's wife called to schedule HFU at office, Milady cross 8/16, there is no consults in pt's chart.  Please advise pt at [phone]

## 2021-09-13 PROBLEM — R79.89 ELEVATED TROPONIN: Status: RESOLVED | Noted: 2021-08-14 | Resolved: 2021-09-13

## 2021-09-13 PROBLEM — R77.8 ELEVATED TROPONIN: Status: RESOLVED | Noted: 2021-08-14 | Resolved: 2021-09-13
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
## 2022-09-22 ENCOUNTER — OFFICE VISIT (OUTPATIENT)
Dept: FAMILY MEDICINE CLINIC | Age: 82
End: 2022-09-22
Payer: MEDICARE

## 2022-09-22 VITALS
SYSTOLIC BLOOD PRESSURE: 124 MMHG | DIASTOLIC BLOOD PRESSURE: 74 MMHG | RESPIRATION RATE: 16 BRPM | OXYGEN SATURATION: 98 % | BODY MASS INDEX: 28.7 KG/M2 | HEIGHT: 71 IN | TEMPERATURE: 97.8 F | HEART RATE: 78 BPM | WEIGHT: 205 LBS

## 2022-09-22 DIAGNOSIS — R26.81 UNSTEADY GAIT WHEN WALKING: ICD-10-CM

## 2022-09-22 DIAGNOSIS — R47.02 EXPRESSIVE DYSPHASIA: Primary | ICD-10-CM

## 2022-09-22 DIAGNOSIS — R53.1 WEAKNESS: ICD-10-CM

## 2022-09-22 DIAGNOSIS — G62.9 POLYNEUROPATHY: ICD-10-CM

## 2022-09-22 DIAGNOSIS — Z86.73 HISTORY OF CVA (CEREBROVASCULAR ACCIDENT): ICD-10-CM

## 2022-09-22 DIAGNOSIS — C61 PROSTATE CANCER (HCC): ICD-10-CM

## 2022-09-22 DIAGNOSIS — N32.81 OVERACTIVE BLADDER: ICD-10-CM

## 2022-09-22 DIAGNOSIS — I10 HYPERTENSION, UNSPECIFIED TYPE: ICD-10-CM

## 2022-09-22 DIAGNOSIS — M62.81 PROXIMAL MUSCLE WEAKNESS: ICD-10-CM

## 2022-09-22 PROBLEM — I63.9 CEREBROVASCULAR ACCIDENT (CVA) (HCC): Status: RESOLVED | Noted: 2022-09-22 | Resolved: 2022-09-22

## 2022-09-22 PROBLEM — I63.9 CEREBROVASCULAR ACCIDENT (CVA) (HCC): Status: ACTIVE | Noted: 2022-09-22

## 2022-09-22 PROCEDURE — 99204 OFFICE O/P NEW MOD 45 MIN: CPT | Performed by: FAMILY MEDICINE

## 2022-09-22 PROCEDURE — G8417 CALC BMI ABV UP PARAM F/U: HCPCS | Performed by: FAMILY MEDICINE

## 2022-09-22 PROCEDURE — 1036F TOBACCO NON-USER: CPT | Performed by: FAMILY MEDICINE

## 2022-09-22 PROCEDURE — 90694 VACC AIIV4 NO PRSRV 0.5ML IM: CPT | Performed by: FAMILY MEDICINE

## 2022-09-22 PROCEDURE — 1123F ACP DISCUSS/DSCN MKR DOCD: CPT | Performed by: FAMILY MEDICINE

## 2022-09-22 PROCEDURE — G8427 DOCREV CUR MEDS BY ELIG CLIN: HCPCS | Performed by: FAMILY MEDICINE

## 2022-09-22 PROCEDURE — G0008 ADMIN INFLUENZA VIRUS VAC: HCPCS | Performed by: FAMILY MEDICINE

## 2022-09-22 RX ORDER — DONEPEZIL HYDROCHLORIDE 10 MG/1
TABLET, FILM COATED ORAL
COMMUNITY
Start: 2022-08-04

## 2022-09-22 RX ORDER — PYRIDOSTIGMINE BROMIDE 60 MG/1
TABLET ORAL
COMMUNITY
Start: 2022-08-26

## 2022-09-22 RX ORDER — AMLODIPINE BESYLATE 5 MG/1
TABLET ORAL
COMMUNITY
Start: 2022-06-15 | End: 2022-10-26 | Stop reason: SDUPTHER

## 2022-09-22 RX ORDER — PREDNISONE 10 MG/1
TABLET ORAL
COMMUNITY
Start: 2022-08-27

## 2022-09-22 RX ORDER — SILDENAFIL 100 MG/1
TABLET, FILM COATED ORAL
COMMUNITY
Start: 2022-09-20

## 2022-09-22 RX ORDER — ATORVASTATIN CALCIUM 20 MG/1
TABLET, FILM COATED ORAL
COMMUNITY
Start: 2022-09-12

## 2022-09-22 RX ORDER — ATORVASTATIN CALCIUM 40 MG/1
TABLET, FILM COATED ORAL
COMMUNITY
Start: 2022-09-18

## 2022-09-22 RX ORDER — POLYETHYLENE GLYCOL 3350 17 G/17G
POWDER, FOR SOLUTION ORAL
COMMUNITY
Start: 2021-05-08

## 2022-09-22 ASSESSMENT — PATIENT HEALTH QUESTIONNAIRE - PHQ9
SUM OF ALL RESPONSES TO PHQ QUESTIONS 1-9: 0
2. FEELING DOWN, DEPRESSED OR HOPELESS: 0
1. LITTLE INTEREST OR PLEASURE IN DOING THINGS: 0
SUM OF ALL RESPONSES TO PHQ QUESTIONS 1-9: 0
SUM OF ALL RESPONSES TO PHQ9 QUESTIONS 1 & 2: 0

## 2022-09-22 NOTE — PROGRESS NOTES
[address] [facility] Amaury presents to the office today for   Chief Complaint   Patient presents with    Establish Care     Hypertension  Taking medication as Rx    Hyperlipidemia  Taking Lipitor as Rx    Dementia  He is taking Aricept   Follows with Dr. Marva Kearney yearly    Myasthenia Gravis  He is on Pyridostigmine and Prednisone  Follows with Dr. Marva Kearney yearly    Enlarged prostate and prostate cancer  Follows with Dr. Homero Giang regularly    They are moving to [facility]    He just had labs done within past week     Review of Systems   Unable to perform ROS: Dementia      /74   Pulse 78   Temp 97.8 °F (36.6 °C) (Temporal)   Resp 16   Ht 5' 11\" (1.803 m)   Wt 205 lb (93 kg)   SpO2 98%   BMI 28.59 kg/m²   Physical Exam  Constitutional:       Appearance: Normal appearance. HENT:      Head: Normocephalic and atraumatic. Eyes:      Extraocular Movements: Extraocular movements intact. Conjunctiva/sclera: Conjunctivae normal.   Cardiovascular:      Rate and Rhythm: Normal rate. Heart sounds: Normal heart sounds. Pulmonary:      Effort: Pulmonary effort is normal.      Breath sounds: Normal breath sounds. Skin:     General: Skin is warm. Neurological:      Mental Status: He is alert. Mental status is at baseline. Current Outpatient Medications:     amLODIPine (NORVASC) 5 MG tablet, take 1 tablet by mouth once daily, Disp: , Rfl:     atorvastatin (LIPITOR) 20 MG tablet, take 1 tablet by mouth once daily, Disp: , Rfl:     atorvastatin (LIPITOR) 40 MG tablet, , Disp: , Rfl:     cyanocobalamin 1000 MCG tablet, Cyanocobalamin (Vitamin B-12) (Vitamin B-12) 1,000 MCG Tablet Active 1000 MCG PO Daily May 3rd, 2021 7:09am, Disp: , Rfl:     donepezil (ARICEPT) 10 MG tablet, take 1 tablet by mouth every morning, Disp: , Rfl:     polyethylene glycol (GLYCOLAX) 17 GM/SCOOP powder, Polyethylene Glycol 3350 (Miralax) 17 GM Powd. Pack Active 17 GM PO Every Day May 8th, 2021 1:57pm, Disp: , Rfl:     predniSONE (DELTASONE) 10 MG tablet, take 1 tablet by mouth once daily, Disp: , Rfl:     pyridostigmine (MESTINON) 60 MG tablet, take 1 tablet by mouth twice a day, Disp: , Rfl:     sildenafil (VIAGRA) 100 MG tablet, TAKE ONE TABLET BY MOUTH APPROXIMATELY ONE HOUR BEFORE SEXUAL ACTIVITY. DO NOT USE MORE THAN ONE DOSE DAILY, Disp: , Rfl:      No past medical history on file. Celestino Callaway was seen today for establish care.     Diagnoses and all orders for this visit:    Expressive dysphasia    Prostate cancer ([facility] [address]    Overactive bladder    Polyneuropathy    History of CVA (cerebrovascular accident)    Hypertension, unspecified type    Proximal muscle weakness    Unsteady gait when walking    Weakness    Other orders  -     Influenza, FLUAD, (age 72 y+), IM, Preservative Free, 0.5 mL     He seems to be at baseline  Moving to assisted living  [facility] and me in 6 months    Florinda Mckay MD

## 2022-10-26 RX ORDER — AMLODIPINE BESYLATE 5 MG/1
TABLET ORAL
Qty: 90 TABLET | Refills: 1 | Status: SHIPPED | OUTPATIENT
Start: 2022-10-26

## 2022-10-28 ENCOUNTER — OFFICE VISIT (OUTPATIENT)
Dept: FAMILY MEDICINE CLINIC | Age: 82
End: 2022-10-28
Payer: MEDICARE

## 2022-10-28 ENCOUNTER — TELEPHONE (OUTPATIENT)
Dept: FAMILY MEDICINE CLINIC | Age: 82
End: 2022-10-28

## 2022-10-28 VITALS
WEIGHT: 205 LBS | TEMPERATURE: 97.1 F | DIASTOLIC BLOOD PRESSURE: 68 MMHG | HEIGHT: 71 IN | BODY MASS INDEX: 28.7 KG/M2 | HEART RATE: 64 BPM | OXYGEN SATURATION: 94 % | SYSTOLIC BLOOD PRESSURE: 118 MMHG
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
## 2022-10-28 DIAGNOSIS — Z86.73 HISTORY OF CVA (CEREBROVASCULAR ACCIDENT): ICD-10-CM

## 2022-10-28 DIAGNOSIS — G70.00 MG (MYASTHENIA GRAVIS) (HCC): ICD-10-CM

## 2022-10-28 DIAGNOSIS — M54.50 ACUTE LOW BACK PAIN WITHOUT SCIATICA, UNSPECIFIED BACK PAIN LATERALITY: ICD-10-CM

## 2022-10-28 PROCEDURE — 3074F SYST BP LT 130 MM HG: CPT | Performed by: INTERNAL MEDICINE

## 2022-10-28 PROCEDURE — G8484 FLU IMMUNIZE NO ADMIN: HCPCS | Performed by: INTERNAL MEDICINE

## 2022-10-28 PROCEDURE — 3078F DIAST BP <80 MM HG: CPT | Performed by: INTERNAL MEDICINE

## 2022-10-28 PROCEDURE — G8417 CALC BMI ABV UP PARAM F/U: HCPCS | Performed by: INTERNAL MEDICINE

## 2022-10-28 PROCEDURE — 1036F TOBACCO NON-USER: CPT | Performed by: INTERNAL MEDICINE

## 2022-10-28 PROCEDURE — 99213 OFFICE O/P EST LOW 20 MIN: CPT | Performed by: INTERNAL MEDICINE

## 2022-10-28 PROCEDURE — G8427 DOCREV CUR MEDS BY ELIG CLIN: HCPCS | Performed by: INTERNAL MEDICINE

## 2022-10-28 PROCEDURE — 1123F ACP DISCUSS/DSCN MKR DOCD: CPT | Performed by: INTERNAL MEDICINE

## 2022-10-29 ASSESSMENT — ENCOUNTER SYMPTOMS
SHORTNESS OF BREATH: 0
ABDOMINAL PAIN: 0
BACK PAIN: 1
COLOR CHANGE: 0

## 2022-10-29 NOTE — PROGRESS NOTES
[address] [place]     10/29/22  Bereket Nuno : 1940 Sex: male  Age: 78 y.o. Chief Complaint   Patient presents with    Giselle Roberts yesterday evening, lost his balance; fell flat on his back; having lower back pain, close to the tailbone    Other     Wife says he has an implant for his bladder? HPI    Patient presents in wheelchair today to express care accompanied by wife and daughter. Is an elderly gentleman with history of myasthenia gravis who had a loss of balance and fall yesterday evening family states he fell flat on his back and has been complaining of pain to lower back. Wife states he does have a bladder stimulator placed. Patient denies any radicular symptoms. No numbness or tingling. Denies weakness related to the fall. Did not hit his head. No loss of consciousness. Did not become dizzy prior to the fall. No loss of continence of bowel or bladder. Review of Systems   Constitutional: Negative. Respiratory:  Negative for shortness of breath. Cardiovascular:  Negative for chest pain. Gastrointestinal:  Negative for abdominal pain. Genitourinary:         Bladder stimulator   Musculoskeletal:  Positive for back pain. Negative for neck pain. Skin:  Negative for color change and wound. Neurological:  Negative for dizziness, syncope, weakness, numbness and headaches. REST OF PERTINENT ROS GONE OVER AND WAS NEGATIVE.        Current Outpatient Medications:     amLODIPine (NORVASC) 5 MG tablet, take 1 tablet by mouth once daily, Disp: 90 tablet, Rfl: 1    atorvastatin (LIPITOR) 20 MG tablet, take 1 tablet by mouth once daily, Disp: , Rfl:     atorvastatin (LIPITOR) 40 MG tablet, , Disp: , Rfl:     cyanocobalamin 1000 MCG tablet, Cyanocobalamin (Vitamin B-12) (Vitamin B-12) 1,000 MCG Tablet Active 1000 MCG PO Daily May 3rd, 2021 7:09am, Disp: , Rfl:     donepezil (ARICEPT) 10 MG tablet, take 1 tablet by mouth every morning, Disp: , Rfl:     polyethylene glycol (GLYCOLAX) 17 GM/SCOOP powder, Polyethylene Glycol 3350 (Miralax) 17 GM Powd. Pack Active 17 GM PO Every Day May 8th, 2021 1:57pm, Disp: , Rfl:     predniSONE (DELTASONE) 10 MG tablet, take 1 tablet by mouth once daily, Disp: , Rfl:     pyridostigmine (MESTINON) 60 MG tablet, take 1 tablet by mouth twice a day, Disp: , Rfl:     sildenafil (VIAGRA) 100 MG tablet, TAKE ONE TABLET BY MOUTH APPROXIMATELY ONE HOUR BEFORE SEXUAL ACTIVITY. DO NOT USE MORE THAN ONE DOSE DAILY, Disp: , Rfl:   No Known Allergies    No past medical history on file. No past surgical history on file. No family history on file. Social History     Socioeconomic History    Marital status: Unknown     Spouse name: Not on file    Number of children: Not on file    Years of education: Not on file    Highest education level: Not on file   Occupational History    Not on file   Tobacco Use    Smoking status: Former     Packs/day: 1.00     Years: 10.00     Pack years: 10.00     Types: Cigarettes     Start date:      Quit date:      Years since quittin.8    Smokeless tobacco: Never   Substance and Sexual Activity    Alcohol use: Not on file    Drug use: Not on file    Sexual activity: Not on file   Other Topics Concern    Not on file   Social History Narrative    Not on file     Social Determinants of Health     Financial Resource Strain: Not on file   Food Insecurity: Not on file   Transportation Needs: Not on file   Physical Activity: Not on file   Stress: Not on file   Social Connections: Not on file   Intimate Partner Violence: Not on file   Housing Stability: Not on file       Vitals:    10/28/22 1246   BP: 118/68   Pulse: 64   Temp: 97.1 °F (36.2 °C)   TempSrc: Temporal   SpO2: 94%   Weight: 205 lb (93 kg)   Height: 5' 11\" (1.803 m)       Physical Exam  Vitals and nursing note reviewed. Constitutional:       General: He is not in acute distress. HENT:      Head: Normocephalic and atraumatic.    Abdominal:      General: Bowel sounds are normal.      Palpations: Abdomen is soft. Tenderness: There is no abdominal tenderness. Musculoskeletal:         General: No swelling, tenderness or deformity. Cervical back: Normal range of motion and neck supple. No tenderness. Comments: On examination to the low back he had no reproducible tenderness to palpation at all. No redness or deformity around the stimulator site. He did get up to stand his when he did have the pain. It was localized to the lower back and upper buttock area but denied any radicular symptoms. Skin:     General: Skin is warm and dry. Findings: No bruising or erythema. Neurological:      General: No focal deficit present. Mental Status: He is alert and oriented to person, place, and time. Psychiatric:         Mood and Affect: Mood normal.         Behavior: Behavior normal.         Thought Content: Thought content normal.         Judgment: Judgment normal.       Assessment and Plan:  Muna Luna was seen today for fall and other. Diagnoses and all orders for this visit:    Fall, initial encounter  -     XR PELVIS (1-2 VIEWS); Future  -     XR LUMBAR SPINE (2-3 VIEWS); Future    Acute low back pain without sciatica, unspecified back pain laterality  -     XR PELVIS (1-2 VIEWS); Future  -     XR LUMBAR SPINE (2-3 VIEWS); Future    History of CVA (cerebrovascular accident)    MG (myasthenia gravis) ([facility] [address]  -     XR PELVIS (1-2 VIEWS); Future  -     XR LUMBAR SPINE (2-3 VIEWS); Future    Plan:I did get x-ray of the lumbar spine and pelvis. No obvious fractures noted. Recommended cold and Tylenol. Follow-up with PCP. Fall precautions. Notify us if not improving. Return for fu pcp. Seen By:  Sade Peralta MD      *Document was created using voice recognition software. Note was reviewed however may contain grammatical errors.

## 2022-11-21 LAB
ANION GAP SERPL CALCULATED.3IONS-SCNC: 12 MMOL/L (ref 7–16)
BASOPHILS ABSOLUTE: 0.1 E9/L (ref 0–0.2)
BASOPHILS RELATIVE PERCENT: 1.3 % (ref 0–2)
BUN BLDV-MCNC: 17 MG/DL (ref 6–23)
CALCIUM SERPL-MCNC: 8.5 MG/DL (ref 8.6–10.2)
CHLORIDE BLD-SCNC: 102 MMOL/L (ref 98–107)
CO2: 21 MMOL/L (ref 22–29)
CREAT SERPL-MCNC: 0.7 MG/DL (ref 0.7–1.2)
EOSINOPHILS ABSOLUTE: 0.48 E9/L (ref 0.05–0.5)
EOSINOPHILS RELATIVE PERCENT: 6.1 % (ref 0–6)
GFR SERPL CREATININE-BSD FRML MDRD: >60 ML/MIN/1.73
GLUCOSE BLD-MCNC: 76 MG/DL (ref 74–99)
HCT VFR BLD CALC: 44 % (ref 37–54)
HEMOGLOBIN: 14.4 G/DL (ref 12.5–16.5)
IMMATURE GRANULOCYTES #: 0.08 E9/L
IMMATURE GRANULOCYTES %: 1 % (ref 0–5)
LYMPHOCYTES ABSOLUTE: 0.72 E9/L (ref 1.5–4)
LYMPHOCYTES RELATIVE PERCENT: 9.2 % (ref 20–42)
MCH RBC QN AUTO: 31 PG (ref 26–35)
MCHC RBC AUTO-ENTMCNC: 32.7 % (ref 32–34.5)
MCV RBC AUTO: 94.8 FL (ref 80–99.9)
MONOCYTES ABSOLUTE: 0.84 E9/L (ref 0.1–0.95)
MONOCYTES RELATIVE PERCENT: 10.7 % (ref 2–12)
NEUTROPHILS ABSOLUTE: 5.63 E9/L (ref 1.8–7.3)
NEUTROPHILS RELATIVE PERCENT: 71.7 % (ref 43–80)
PDW BLD-RTO: 14.2 FL (ref 11.5–15)
PLATELET # BLD: 241 E9/L (ref 130–450)
PMV BLD AUTO: 11.3 FL (ref 7–12)
POTASSIUM SERPL-SCNC: 4.7 MMOL/L (ref 3.5–5)
RBC # BLD: 4.64 E12/L (ref 3.8–5.8)
SODIUM BLD-SCNC: 135 MMOL/L (ref 132–146)
WBC # BLD: 7.9 E9/L (ref 4.5–11.5)

## 2022-11-28 LAB
ANION GAP SERPL CALCULATED.3IONS-SCNC: 11 MMOL/L (ref 7–16)
BASOPHILS ABSOLUTE: 0.07 E9/L (ref 0–0.2)
BASOPHILS RELATIVE PERCENT: 0.8 % (ref 0–2)
BUN BLDV-MCNC: 14 MG/DL (ref 6–23)
CALCIUM SERPL-MCNC: 8.7 MG/DL (ref 8.6–10.2)
CHLORIDE BLD-SCNC: 104 MMOL/L (ref 98–107)
CO2: 27 MMOL/L (ref 22–29)
CREAT SERPL-MCNC: 1 MG/DL (ref 0.7–1.2)
EOSINOPHILS ABSOLUTE: 0.33 E9/L (ref 0.05–0.5)
EOSINOPHILS RELATIVE PERCENT: 4 % (ref 0–6)
GFR SERPL CREATININE-BSD FRML MDRD: >60 ML/MIN/1.73
GLUCOSE BLD-MCNC: 78 MG/DL (ref 74–99)
HCT VFR BLD CALC: 43.1 % (ref 37–54)
HEMOGLOBIN: 13.7 G/DL (ref 12.5–16.5)
IMMATURE GRANULOCYTES #: 0.06 E9/L
IMMATURE GRANULOCYTES %: 0.7 % (ref 0–5)
LYMPHOCYTES ABSOLUTE: 1.04 E9/L (ref 1.5–4)
LYMPHOCYTES RELATIVE PERCENT: 12.6 % (ref 20–42)
MCH RBC QN AUTO: 30.2 PG (ref 26–35)
MCHC RBC AUTO-ENTMCNC: 31.8 % (ref 32–34.5)
MCV RBC AUTO: 94.9 FL (ref 80–99.9)
MONOCYTES ABSOLUTE: 0.72 E9/L (ref 0.1–0.95)
MONOCYTES RELATIVE PERCENT: 8.7 % (ref 2–12)
NEUTROPHILS ABSOLUTE: 6.05 E9/L (ref 1.8–7.3)
NEUTROPHILS RELATIVE PERCENT: 73.2 % (ref 43–80)
PDW BLD-RTO: 13.4 FL (ref 11.5–15)
PLATELET # BLD: 272 E9/L (ref 130–450)
PMV BLD AUTO: 10 FL (ref 7–12)
POTASSIUM SERPL-SCNC: 4 MMOL/L (ref 3.5–5)
RBC # BLD: 4.54 E12/L (ref 3.8–5.8)
SODIUM BLD-SCNC: 142 MMOL/L (ref 132–146)
WBC # BLD: 8.3 E9/L (ref 4.5–11.5)

## 2022-12-05 LAB
ANION GAP SERPL CALCULATED.3IONS-SCNC: 16 MMOL/L (ref 7–16)
BASOPHILS ABSOLUTE: 0.06 E9/L (ref 0–0.2)
BASOPHILS RELATIVE PERCENT: 0.8 % (ref 0–2)
BUN BLDV-MCNC: 14 MG/DL (ref 6–23)
CALCIUM SERPL-MCNC: 9 MG/DL (ref 8.6–10.2)
CHLORIDE BLD-SCNC: 105 MMOL/L (ref 98–107)
CO2: 23 MMOL/L (ref 22–29)
CREAT SERPL-MCNC: 0.9 MG/DL (ref 0.7–1.2)
EOSINOPHILS ABSOLUTE: 0.38 E9/L (ref 0.05–0.5)
EOSINOPHILS RELATIVE PERCENT: 5.4 % (ref 0–6)
GFR SERPL CREATININE-BSD FRML MDRD: >60 ML/MIN/1.73
GLUCOSE BLD-MCNC: 78 MG/DL (ref 74–99)
HCT VFR BLD CALC: 42.8 % (ref 37–54)
HEMOGLOBIN: 13.7 G/DL (ref 12.5–16.5)
IMMATURE GRANULOCYTES #: 0.04 E9/L
IMMATURE GRANULOCYTES %: 0.6 % (ref 0–5)
LYMPHOCYTES ABSOLUTE: 1.11 E9/L (ref 1.5–4)
LYMPHOCYTES RELATIVE PERCENT: 15.6 % (ref 20–42)
MCH RBC QN AUTO: 29.8 PG (ref 26–35)
MCHC RBC AUTO-ENTMCNC: 32 % (ref 32–34.5)
MCV RBC AUTO: 93 FL (ref 80–99.9)
MONOCYTES ABSOLUTE: 0.72 E9/L (ref 0.1–0.95)
MONOCYTES RELATIVE PERCENT: 10.1 % (ref 2–12)
NEUTROPHILS ABSOLUTE: 4.79 E9/L (ref 1.8–7.3)
NEUTROPHILS RELATIVE PERCENT: 67.5 % (ref 43–80)
PDW BLD-RTO: 13.4 FL (ref 11.5–15)
PLATELET # BLD: 280 E9/L (ref 130–450)
PMV BLD AUTO: 9.8 FL (ref 7–12)
POTASSIUM SERPL-SCNC: 3.9 MMOL/L (ref 3.5–5)
RBC # BLD: 4.6 E12/L (ref 3.8–5.8)
SODIUM BLD-SCNC: 144 MMOL/L (ref 132–146)
WBC # BLD: 7.1 E9/L (ref 4.5–11.5)

## 2022-12-25 ENCOUNTER — APPOINTMENT (OUTPATIENT)
Dept: GENERAL RADIOLOGY | Age: 82
End: 2022-12-25
Payer: MEDICARE

## 2022-12-25 ENCOUNTER — HOSPITAL ENCOUNTER (INPATIENT)
Age: 82
LOS: 5 days | Discharge: NURSING, REHAB OR LONG-TERM CARE | End: 2022-12-30
Attending: EMERGENCY MEDICINE | Admitting: INTERNAL MEDICINE
Payer: MEDICARE

## 2022-12-25 ENCOUNTER — APPOINTMENT (OUTPATIENT)
Dept: CT IMAGING | Age: 82
End: 2022-12-25
Payer: MEDICARE

## 2022-12-25 DIAGNOSIS — J10.1 INFLUENZA A: ICD-10-CM

## 2022-12-25 DIAGNOSIS — J96.01 ACUTE RESPIRATORY FAILURE WITH HYPOXIA (HCC): Primary | ICD-10-CM

## 2022-12-25 DIAGNOSIS — G93.41 METABOLIC ENCEPHALOPATHY: ICD-10-CM

## 2022-12-25 PROBLEM — J96.91 RESPIRATORY FAILURE WITH HYPOXIA (HCC): Status: ACTIVE | Noted: 2022-12-25

## 2022-12-25 LAB
ALBUMIN SERPL-MCNC: 3.3 G/DL (ref 3.5–5.2)
ALP BLD-CCNC: 67 U/L (ref 40–129)
ALT SERPL-CCNC: 10 U/L (ref 0–40)
AMMONIA: 23.6 UMOL/L (ref 16–60)
ANION GAP SERPL CALCULATED.3IONS-SCNC: 9 MMOL/L (ref 7–16)
AST SERPL-CCNC: 21 U/L (ref 0–39)
B.E.: 3 MMOL/L (ref -3–3)
BACTERIA: ABNORMAL /HPF
BASOPHILS ABSOLUTE: 0.06 E9/L (ref 0–0.2)
BASOPHILS RELATIVE PERCENT: 0.5 % (ref 0–2)
BILIRUB SERPL-MCNC: 0.5 MG/DL (ref 0–1.2)
BILIRUBIN URINE: NEGATIVE
BLOOD, URINE: ABNORMAL
BUN BLDV-MCNC: 21 MG/DL (ref 6–23)
CALCIUM SERPL-MCNC: 8.2 MG/DL (ref 8.6–10.2)
CHLORIDE BLD-SCNC: 101 MMOL/L (ref 98–107)
CLARITY: CLEAR
CO2: 26 MMOL/L (ref 22–29)
COHB: 1.1 % (ref 0–1.5)
COLOR: YELLOW
CREAT SERPL-MCNC: 0.9 MG/DL (ref 0.7–1.2)
CRITICAL: ABNORMAL
DATE ANALYZED: ABNORMAL
DATE OF COLLECTION: ABNORMAL
EOSINOPHILS ABSOLUTE: 0.02 E9/L (ref 0.05–0.5)
EOSINOPHILS RELATIVE PERCENT: 0.2 % (ref 0–6)
GFR SERPL CREATININE-BSD FRML MDRD: >60 ML/MIN/1.73
GLUCOSE BLD-MCNC: 146 MG/DL (ref 74–99)
GLUCOSE URINE: NEGATIVE MG/DL
HCO3: 27.2 MMOL/L (ref 22–26)
HCT VFR BLD CALC: 42.8 % (ref 37–54)
HEMOGLOBIN: 14.1 G/DL (ref 12.5–16.5)
HHB: 4.6 % (ref 0–5)
IMMATURE GRANULOCYTES #: 0.05 E9/L
IMMATURE GRANULOCYTES %: 0.4 % (ref 0–5)
INFLUENZA A BY PCR: DETECTED
INFLUENZA B BY PCR: NOT DETECTED
KETONES, URINE: ABNORMAL MG/DL
LAB: ABNORMAL
LACTIC ACID: 1.1 MMOL/L (ref 0.5–2.2)
LEUKOCYTE ESTERASE, URINE: NEGATIVE
LIPASE: 23 U/L (ref 13–60)
LYMPHOCYTES ABSOLUTE: 0.23 E9/L (ref 1.5–4)
LYMPHOCYTES RELATIVE PERCENT: 1.9 % (ref 20–42)
Lab: ABNORMAL
MAGNESIUM: 2.1 MG/DL (ref 1.6–2.6)
MCH RBC QN AUTO: 30 PG (ref 26–35)
MCHC RBC AUTO-ENTMCNC: 32.9 % (ref 32–34.5)
MCV RBC AUTO: 91.1 FL (ref 80–99.9)
METHB: 0.3 % (ref 0–1.5)
MODE: ABNORMAL
MONOCYTES ABSOLUTE: 0.99 E9/L (ref 0.1–0.95)
MONOCYTES RELATIVE PERCENT: 8 % (ref 2–12)
NEUTROPHILS ABSOLUTE: 11.06 E9/L (ref 1.8–7.3)
NEUTROPHILS RELATIVE PERCENT: 89 % (ref 43–80)
NITRITE, URINE: NEGATIVE
O2 CONTENT: 18.9 ML/DL
O2 SATURATION: 95.3 % (ref 92–98.5)
O2HB: 94 % (ref 94–97)
OPERATOR ID: 420
OVALOCYTES: ABNORMAL
PATIENT TEMP: 37 C
PCO2: 40.3 MMHG (ref 35–45)
PDW BLD-RTO: 13.3 FL (ref 11.5–15)
PH BLOOD GAS: 7.45 (ref 7.35–7.45)
PH UA: 5.5 (ref 5–9)
PLATELET # BLD: 233 E9/L (ref 130–450)
PMV BLD AUTO: 9.9 FL (ref 7–12)
PO2: 75.3 MMHG (ref 75–100)
POIKILOCYTES: ABNORMAL
POLYCHROMASIA: ABNORMAL
POTASSIUM SERPL-SCNC: 4 MMOL/L (ref 3.5–5)
PRO-BNP: 859 PG/ML (ref 0–450)
PROTEIN UA: NEGATIVE MG/DL
RBC # BLD: 4.7 E12/L (ref 3.8–5.8)
RBC UA: ABNORMAL /HPF (ref 0–2)
SARS-COV-2, NAAT: NOT DETECTED
SODIUM BLD-SCNC: 136 MMOL/L (ref 132–146)
SOURCE, BLOOD GAS: ABNORMAL
SPECIFIC GRAVITY UA: >=1.03 (ref 1–1.03)
THB: 14.3 G/DL (ref 11.5–16.5)
TIME ANALYZED: 846
TOTAL PROTEIN: 6 G/DL (ref 6.4–8.3)
TROPONIN, HIGH SENSITIVITY: 33 NG/L (ref 0–11)
TROPONIN, HIGH SENSITIVITY: 35 NG/L (ref 0–11)
UROBILINOGEN, URINE: 0.2 E.U./DL
VACUOLATED NEUTROPHILS: ABNORMAL
WBC # BLD: 12.4 E9/L (ref 4.5–11.5)
WBC UA: ABNORMAL /HPF (ref 0–5)

## 2022-12-25 PROCEDURE — 84484 ASSAY OF TROPONIN QUANT: CPT

## 2022-12-25 PROCEDURE — 94640 AIRWAY INHALATION TREATMENT: CPT

## 2022-12-25 PROCEDURE — 36415 COLL VENOUS BLD VENIPUNCTURE: CPT

## 2022-12-25 PROCEDURE — 87502 INFLUENZA DNA AMP PROBE: CPT

## 2022-12-25 PROCEDURE — 6370000000 HC RX 637 (ALT 250 FOR IP): Performed by: EMERGENCY MEDICINE

## 2022-12-25 PROCEDURE — 83880 ASSAY OF NATRIURETIC PEPTIDE: CPT

## 2022-12-25 PROCEDURE — 6370000000 HC RX 637 (ALT 250 FOR IP): Performed by: NURSE PRACTITIONER

## 2022-12-25 PROCEDURE — 93005 ELECTROCARDIOGRAM TRACING: CPT | Performed by: EMERGENCY MEDICINE

## 2022-12-25 PROCEDURE — 2700000000 HC OXYGEN THERAPY PER DAY

## 2022-12-25 PROCEDURE — 81001 URINALYSIS AUTO W/SCOPE: CPT

## 2022-12-25 PROCEDURE — 71045 X-RAY EXAM CHEST 1 VIEW: CPT

## 2022-12-25 PROCEDURE — 96374 THER/PROPH/DIAG INJ IV PUSH: CPT

## 2022-12-25 PROCEDURE — 99285 EMERGENCY DEPT VISIT HI MDM: CPT

## 2022-12-25 PROCEDURE — 2060000000 HC ICU INTERMEDIATE R&B

## 2022-12-25 PROCEDURE — 6360000002 HC RX W HCPCS: Performed by: EMERGENCY MEDICINE

## 2022-12-25 PROCEDURE — 94664 DEMO&/EVAL PT USE INHALER: CPT

## 2022-12-25 PROCEDURE — 83735 ASSAY OF MAGNESIUM: CPT

## 2022-12-25 PROCEDURE — 70450 CT HEAD/BRAIN W/O DYE: CPT

## 2022-12-25 PROCEDURE — 83605 ASSAY OF LACTIC ACID: CPT

## 2022-12-25 PROCEDURE — 2580000003 HC RX 258: Performed by: EMERGENCY MEDICINE

## 2022-12-25 PROCEDURE — 2580000003 HC RX 258: Performed by: FAMILY MEDICINE

## 2022-12-25 PROCEDURE — 83690 ASSAY OF LIPASE: CPT

## 2022-12-25 PROCEDURE — 6360000002 HC RX W HCPCS: Performed by: FAMILY MEDICINE

## 2022-12-25 PROCEDURE — 87635 SARS-COV-2 COVID-19 AMP PRB: CPT

## 2022-12-25 PROCEDURE — 82140 ASSAY OF AMMONIA: CPT

## 2022-12-25 PROCEDURE — 6370000000 HC RX 637 (ALT 250 FOR IP): Performed by: FAMILY MEDICINE

## 2022-12-25 PROCEDURE — 82805 BLOOD GASES W/O2 SATURATION: CPT

## 2022-12-25 PROCEDURE — 80053 COMPREHEN METABOLIC PANEL: CPT

## 2022-12-25 PROCEDURE — 85025 COMPLETE CBC W/AUTO DIFF WBC: CPT

## 2022-12-25 PROCEDURE — 87040 BLOOD CULTURE FOR BACTERIA: CPT

## 2022-12-25 RX ORDER — ACETAMINOPHEN 325 MG/1
650 TABLET ORAL EVERY 6 HOURS PRN
Status: DISCONTINUED | OUTPATIENT
Start: 2022-12-25 | End: 2022-12-31 | Stop reason: HOSPADM

## 2022-12-25 RX ORDER — METHYLPREDNISOLONE SODIUM SUCCINATE 125 MG/2ML
60 INJECTION, POWDER, LYOPHILIZED, FOR SOLUTION INTRAMUSCULAR; INTRAVENOUS ONCE
Status: COMPLETED | OUTPATIENT
Start: 2022-12-25 | End: 2022-12-25

## 2022-12-25 RX ORDER — IPRATROPIUM BROMIDE AND ALBUTEROL SULFATE 2.5; .5 MG/3ML; MG/3ML
1 SOLUTION RESPIRATORY (INHALATION)
Status: DISCONTINUED | OUTPATIENT
Start: 2022-12-26 | End: 2022-12-31 | Stop reason: HOSPADM

## 2022-12-25 RX ORDER — IPRATROPIUM BROMIDE AND ALBUTEROL SULFATE 2.5; .5 MG/3ML; MG/3ML
1 SOLUTION RESPIRATORY (INHALATION)
Status: COMPLETED | OUTPATIENT
Start: 2022-12-25 | End: 2022-12-25

## 2022-12-25 RX ORDER — SODIUM CHLORIDE 9 MG/ML
INJECTION, SOLUTION INTRAVENOUS CONTINUOUS
Status: DISCONTINUED | OUTPATIENT
Start: 2022-12-25 | End: 2022-12-26

## 2022-12-25 RX ORDER — ONDANSETRON 2 MG/ML
4 INJECTION INTRAMUSCULAR; INTRAVENOUS EVERY 6 HOURS PRN
Status: DISCONTINUED | OUTPATIENT
Start: 2022-12-25 | End: 2022-12-31 | Stop reason: HOSPADM

## 2022-12-25 RX ORDER — OSELTAMIVIR PHOSPHATE 75 MG/1
75 CAPSULE ORAL ONCE
Status: COMPLETED | OUTPATIENT
Start: 2022-12-25 | End: 2022-12-25

## 2022-12-25 RX ORDER — ACETAMINOPHEN 500 MG
1000 TABLET ORAL ONCE
Status: COMPLETED | OUTPATIENT
Start: 2022-12-25 | End: 2022-12-25

## 2022-12-25 RX ORDER — M-VIT,TX,IRON,MINS/CALC/FOLIC 27MG-0.4MG
1 TABLET ORAL DAILY
COMMUNITY

## 2022-12-25 RX ORDER — MAGNESIUM OXIDE 400 MG/1
400 TABLET ORAL DAILY
COMMUNITY

## 2022-12-25 RX ORDER — SODIUM CHLORIDE 9 MG/ML
INJECTION, SOLUTION INTRAVENOUS PRN
Status: DISCONTINUED | OUTPATIENT
Start: 2022-12-25 | End: 2022-12-31 | Stop reason: HOSPADM

## 2022-12-25 RX ORDER — FAMOTIDINE 20 MG/1
20 TABLET, FILM COATED ORAL 2 TIMES DAILY
COMMUNITY

## 2022-12-25 RX ORDER — 0.9 % SODIUM CHLORIDE 0.9 %
1000 INTRAVENOUS SOLUTION INTRAVENOUS ONCE
Status: COMPLETED | OUTPATIENT
Start: 2022-12-25 | End: 2022-12-25

## 2022-12-25 RX ORDER — DONEPEZIL HYDROCHLORIDE 10 MG/1
10 TABLET, FILM COATED ORAL NIGHTLY
Status: DISCONTINUED | OUTPATIENT
Start: 2022-12-25 | End: 2022-12-31 | Stop reason: HOSPADM

## 2022-12-25 RX ORDER — AMOXICILLIN 250 MG
1 CAPSULE ORAL 2 TIMES DAILY
COMMUNITY

## 2022-12-25 RX ORDER — PYRIDOXINE HCL (VITAMIN B6) 100 MG
100 TABLET ORAL DAILY
COMMUNITY

## 2022-12-25 RX ORDER — POTASSIUM CHLORIDE 7.45 MG/ML
10 INJECTION INTRAVENOUS PRN
Status: DISCONTINUED | OUTPATIENT
Start: 2022-12-25 | End: 2022-12-31 | Stop reason: HOSPADM

## 2022-12-25 RX ORDER — PREDNISONE 1 MG/1
10 TABLET ORAL DAILY
Status: DISCONTINUED | OUTPATIENT
Start: 2022-12-25 | End: 2022-12-31 | Stop reason: HOSPADM

## 2022-12-25 RX ORDER — SODIUM CHLORIDE 0.9 % (FLUSH) 0.9 %
5-40 SYRINGE (ML) INJECTION EVERY 12 HOURS SCHEDULED
Status: DISCONTINUED | OUTPATIENT
Start: 2022-12-25 | End: 2022-12-31 | Stop reason: HOSPADM

## 2022-12-25 RX ORDER — ONDANSETRON 4 MG/1
4 TABLET, ORALLY DISINTEGRATING ORAL EVERY 8 HOURS PRN
Status: DISCONTINUED | OUTPATIENT
Start: 2022-12-25 | End: 2022-12-31 | Stop reason: HOSPADM

## 2022-12-25 RX ORDER — ACETAMINOPHEN 650 MG/1
650 SUPPOSITORY RECTAL EVERY 6 HOURS PRN
Status: DISCONTINUED | OUTPATIENT
Start: 2022-12-25 | End: 2022-12-31 | Stop reason: HOSPADM

## 2022-12-25 RX ORDER — OSELTAMIVIR PHOSPHATE 75 MG/1
75 CAPSULE ORAL 2 TIMES DAILY
Status: COMPLETED | OUTPATIENT
Start: 2022-12-25 | End: 2022-12-30

## 2022-12-25 RX ORDER — ASPIRIN 81 MG/1
81 TABLET ORAL DAILY
COMMUNITY

## 2022-12-25 RX ORDER — AMLODIPINE BESYLATE 5 MG/1
5 TABLET ORAL DAILY
Status: DISCONTINUED | OUTPATIENT
Start: 2022-12-25 | End: 2022-12-31 | Stop reason: HOSPADM

## 2022-12-25 RX ORDER — POLYETHYLENE GLYCOL 3350 17 G/17G
17 POWDER, FOR SOLUTION ORAL DAILY PRN
Status: DISCONTINUED | OUTPATIENT
Start: 2022-12-25 | End: 2022-12-31 | Stop reason: HOSPADM

## 2022-12-25 RX ORDER — ENOXAPARIN SODIUM 100 MG/ML
40 INJECTION SUBCUTANEOUS DAILY
Status: DISCONTINUED | OUTPATIENT
Start: 2022-12-25 | End: 2022-12-31 | Stop reason: HOSPADM

## 2022-12-25 RX ORDER — PYRIDOSTIGMINE BROMIDE 60 MG/1
60 TABLET ORAL 2 TIMES DAILY
Status: DISCONTINUED | OUTPATIENT
Start: 2022-12-25 | End: 2022-12-31 | Stop reason: HOSPADM

## 2022-12-25 RX ORDER — POTASSIUM CHLORIDE 20 MEQ/1
40 TABLET, EXTENDED RELEASE ORAL PRN
Status: DISCONTINUED | OUTPATIENT
Start: 2022-12-25 | End: 2022-12-31 | Stop reason: HOSPADM

## 2022-12-25 RX ORDER — ATORVASTATIN CALCIUM 40 MG/1
40 TABLET, FILM COATED ORAL NIGHTLY
Status: DISCONTINUED | OUTPATIENT
Start: 2022-12-25 | End: 2022-12-31 | Stop reason: HOSPADM

## 2022-12-25 RX ORDER — SODIUM CHLORIDE 0.9 % (FLUSH) 0.9 %
10 SYRINGE (ML) INJECTION PRN
Status: DISCONTINUED | OUTPATIENT
Start: 2022-12-25 | End: 2022-12-31 | Stop reason: HOSPADM

## 2022-12-25 RX ORDER — ACETAMINOPHEN 325 MG/1
650 TABLET ORAL EVERY 6 HOURS PRN
COMMUNITY

## 2022-12-25 RX ADMIN — ENOXAPARIN SODIUM 40 MG: 100 INJECTION SUBCUTANEOUS at 13:45

## 2022-12-25 RX ADMIN — METHYLPREDNISOLONE SODIUM SUCCINATE 60 MG: 125 INJECTION, POWDER, FOR SOLUTION INTRAMUSCULAR; INTRAVENOUS at 09:08

## 2022-12-25 RX ADMIN — PREDNISONE 10 MG: 5 TABLET ORAL at 13:45

## 2022-12-25 RX ADMIN — AMLODIPINE BESYLATE 5 MG: 5 TABLET ORAL at 13:45

## 2022-12-25 RX ADMIN — IPRATROPIUM BROMIDE AND ALBUTEROL SULFATE 1 AMPULE: .5; 2.5 SOLUTION RESPIRATORY (INHALATION) at 08:33

## 2022-12-25 RX ADMIN — IPRATROPIUM BROMIDE AND ALBUTEROL SULFATE 1 AMPULE: .5; 2.5 SOLUTION RESPIRATORY (INHALATION) at 21:27

## 2022-12-25 RX ADMIN — OSELTAMIVIR PHOSPHATE 75 MG: 75 CAPSULE ORAL at 21:10

## 2022-12-25 RX ADMIN — ACETAMINOPHEN 1000 MG: 500 TABLET ORAL at 09:09

## 2022-12-25 RX ADMIN — ATORVASTATIN CALCIUM 40 MG: 40 TABLET, FILM COATED ORAL at 21:09

## 2022-12-25 RX ADMIN — PYRIDOSTIGMINE BROMIDE 60 MG: 60 TABLET ORAL at 13:45

## 2022-12-25 RX ADMIN — OSELTAMIVIR PHOSPHATE 75 MG: 75 CAPSULE ORAL at 10:52

## 2022-12-25 RX ADMIN — SODIUM CHLORIDE 1000 ML: 9 INJECTION, SOLUTION INTRAVENOUS at 09:09

## 2022-12-25 RX ADMIN — PYRIDOSTIGMINE BROMIDE 60 MG: 60 TABLET ORAL at 21:10

## 2022-12-25 RX ADMIN — IPRATROPIUM BROMIDE AND ALBUTEROL SULFATE 1 AMPULE: .5; 2.5 SOLUTION RESPIRATORY (INHALATION) at 08:36

## 2022-12-25 RX ADMIN — IPRATROPIUM BROMIDE AND ALBUTEROL SULFATE 1 AMPULE: .5; 2.5 SOLUTION RESPIRATORY (INHALATION) at 08:34

## 2022-12-25 RX ADMIN — SODIUM CHLORIDE: 9 INJECTION, SOLUTION INTRAVENOUS at 13:10

## 2022-12-25 RX ADMIN — DONEPEZIL HYDROCHLORIDE 10 MG: 10 TABLET, FILM COATED ORAL at 21:09

## 2022-12-25 ASSESSMENT — LIFESTYLE VARIABLES
HOW OFTEN DO YOU HAVE A DRINK CONTAINING ALCOHOL: NEVER
HOW MANY STANDARD DRINKS CONTAINING ALCOHOL DO YOU HAVE ON A TYPICAL DAY: PATIENT DOES NOT DRINK

## 2022-12-25 ASSESSMENT — PAIN - FUNCTIONAL ASSESSMENT
PAIN_FUNCTIONAL_ASSESSMENT: NONE - DENIES PAIN
PAIN_FUNCTIONAL_ASSESSMENT: NONE - DENIES PAIN

## 2022-12-25 NOTE — ED PROVIDER NOTES
HPI:  12/25/22, Time: 8:22 AM ONEL Santana is a 78 y.o. male presenting to the ED for fever and cough. Patient presents from [address] [place] [address]. He is confused, so I was unable to obtain complete history. He is unable to provide any meaningful history. History was obtained from EMS. Apparently patient recently had hip surgery, and he has been at [facility] for approximately 5 weeks. He is normally on 2 L of oxygen but was saturating in the 80s on his baseline oxygen today so was placed on additional oxygen. He has had a cough and fevers. There is been no history of falls. I reviewed the paperwork from the nursing home. He has a history of myasthenia gravis. He does not appear to be on any anticoagulation. He is on chronic steroids. Patient is also more confused per normal per EMS. He has no acute complaints in my examination. Review of Systems:   Unable to obtain complete review of systems due to altered mental status.          --------------------------------------------- PAST HISTORY ---------------------------------------------  Past Medical History:  has no past medical history on file. Past Surgical History:  has no past surgical history on file. Social History:  reports that he quit smoking about 54 years ago. His smoking use included cigarettes. He started smoking about 64 years ago. He has a 10.00 pack-year smoking history. He has never used smokeless tobacco.    Family History: family history is not on file. The patients home medications have been reviewed. Allergies: Patient has no known allergies.     -------------------------------------------------- RESULTS -------------------------------------------------  All laboratory and radiology results have been personally reviewed by myself   LABS:  Results for orders placed or performed during the hospital encounter of 12/25/22   COVID-19, Rapid    Specimen: Nasopharyngeal Swab   Result Value Ref Range    SARS-CoV-2, NAAT Not Detected Not Detected   RAPID INFLUENZA A/B ANTIGENS    Specimen: Nasopharyngeal   Result Value Ref Range    Influenza A by PCR DETECTED (A) Not Detected    Influenza B by PCR Not Detected Not Detected   CBC with Auto Differential   Result Value Ref Range    WBC 12.4 (H) 4.5 - 11.5 E9/L    RBC 4.70 3.80 - 5.80 E12/L    Hemoglobin 14.1 12.5 - 16.5 g/dL    Hematocrit 42.8 37.0 - 54.0 %    MCV 91.1 80.0 - 99.9 fL    MCH 30.0 26.0 - 35.0 pg    MCHC 32.9 32.0 - 34.5 %    RDW 13.3 11.5 - 15.0 fL    Platelets 836 551 - 921 E9/L    MPV 9.9 7.0 - 12.0 fL   CMP   Result Value Ref Range    Sodium 136 132 - 146 mmol/L    Potassium 4.0 3.5 - 5.0 mmol/L    Chloride 101 98 - 107 mmol/L    CO2 26 22 - 29 mmol/L    Anion Gap 9 7 - 16 mmol/L    Glucose 146 (H) 74 - 99 mg/dL    BUN 21 6 - 23 mg/dL    Creatinine 0.9 0.7 - 1.2 mg/dL    Est, Glom Filt Rate >60 >=60 mL/min/1.73    Calcium 8.2 (L) 8.6 - 10.2 mg/dL    Total Protein 6.0 (L) 6.4 - 8.3 g/dL    Albumin 3.3 (L) 3.5 - 5.2 g/dL    Total Bilirubin 0.5 0.0 - 1.2 mg/dL    Alkaline Phosphatase 67 40 - 129 U/L    ALT 10 0 - 40 U/L    AST 21 0 - 39 U/L   Lactic Acid   Result Value Ref Range    Lactic Acid 1.1 0.5 - 2.2 mmol/L   Lipase   Result Value Ref Range    Lipase 23 13 - 60 U/L   Magnesium   Result Value Ref Range    Magnesium 2.1 1.6 - 2.6 mg/dL   Troponin   Result Value Ref Range    Troponin, High Sensitivity 35 (H) 0 - 11 ng/L   Brain Natriuretic Peptide   Result Value Ref Range    Pro- (H) 0 - 450 pg/mL   Ammonia   Result Value Ref Range    Ammonia 23.6 16.0 - 60.0 umol/L   Blood Gas, Arterial   Result Value Ref Range    Date Analyzed 20221225     Time Analyzed 0846     Source: Blood Arterial     pH, Blood Gas 7.447 7.350 - 7.450    PCO2 40.3 35.0 - 45.0 mmHg    PO2 75.3 75.0 - 100.0 mmHg    HCO3 27.2 (H) 22.0 - 26.0 mmol/L    B.E. 3.0 -3.0 - 3.0 mmol/L    O2 Sat 95.3 92.0 - 98.5 %    O2Hb 94.0 94.0 - 97.0 %    COHb 1.1 0.0 - 1.5 %    MetHb 0.3 0.0 - 1.5 % O2 Content 18.9 mL/dL    HHb 4.6 0.0 - 5.0 %    tHb (est) 14.3 11.5 - 16.5 g/dL    Mode NC-4 L     Date Of Collection      Time Collected      Pt Temp 37.0 C     ID [id number]     Lab [id number]     Critical(s) Notified . No Critical Values        RADIOLOGY:  Interpreted by Radiologist.  CT Head W/O Contrast   Final Result   No acute intracranial abnormality. 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 XR CHEST PORTABLE   Final Result   Mild cardiomegaly with mild CHF. Viral pneumonia is also a consideration.             ------------------------- NURSING NOTES AND VITALS REVIEWED ---------------------------   The nursing notes within the ED encounter and vital signs as below have been reviewed. /62   Pulse (!) 104   Temp 99.2 °F (37.3 °C) (Axillary)   Resp 24   Ht 5' 11\" (1.803 m)   Wt 180 lb (81.6 kg)   SpO2 93%   BMI 25.10 kg/m²   Oxygen Saturation Interpretation: Abnormal and Improved after treatment      ---------------------------------------------------PHYSICAL EXAM--------------------------------------      Constitutional/General: Alert, not oriented, appears ill  Head: Normocephalic and atraumatic  Eyes: PERRL, EOMI  Mouth: Oropharynx clear, handling secretions, no trismus  Neck: Supple, full ROM,   Pulmonary: Lungs diminished with rhonchi in all lung fields. Moderate respiratory distress  Cardiovascular:  Regular rate and rhythm, no murmurs, gallops, or rubs. 2+ distal pulses  Abdomen: Soft, non tender, non distended,   Hip: well healing surgical incision to right hip, no evidence of infection  Extremities: Moves all extremities x 4. Warm and well perfused. Bilateral pitting edema to lower extremities, soft and easily compressible compartments  Skin: warm and dry without rash  Neurologic: Alert, confused, no focal motor or sensory deficits   Psych: Normal Affect.  Behavior normal.      ------------------------------ ED COURSE/MEDICAL DECISION MAKING----------------------  Medications   acetaminophen (TYLENOL) tablet 1,000 mg (1,000 mg Oral Given 12/25/22 0909)   ipratropium-albuterol (DUONEB) nebulizer solution 1 ampule (1 ampule Inhalation Given 12/25/22 0836)   methylPREDNISolone sodium (SOLU-MEDROL) injection 60 mg (60 mg IntraVENous Given 12/25/22 0908)   0.9 % sodium chloride bolus (1,000 mLs IntraVENous New Bag 12/25/22 0909)   oseltamivir (TAMIFLU) capsule 75 mg (75 mg Oral Given 12/25/22 1052)       Medical Decision Making/ED COURSE:   Patient is an 78-year-old male presenting from a nursing home with altered mental status, fevers, cough. In the ED, patient was hemodynamically stable, febrile with initial temp of 101.9 F. On exam, appeared ill but was awake and protecting his airway. He was saturating well on 6 L nasal cannula but had diminished breath sounds and rhonchi in all lung fields. Patient was placed on the cardiac monitor. I interpreted findings. Rhythm - sinus. Labs, CXR, and head CT obtained. Patient administered tylenol, solumedrol, and duonebs. Patient does appear to be fluid overloaded so he will be gently hydrated. I reviewed and interpreted labs. Labs are significant for mild leukocytosis of 12.4. High-sensitivity troponin is 35, repeat pending. No active chest pain or acute EKG changes. No acute findings on head CT. Chest x-ray showed CHF versus viral pneumonia. Patient is positive for influenza A which I suspect is causing his hypoxia and abnormal breath sounds. He has been given Tamiflu. He will be admitted due to hypoxia and altered mental status. Patient remained hemodynamically stable throughout ED course. ED Course as of 12/25/22 1105   Sun Dec 25, 2022   8906 Patient presents with DNR CCA paperwork from the nursing home. Will update chart [JA]   0183 EKG:   This EKG is signed and interpreted by me, Dr. Santos Amador MD    Rate: 99  Rhythm: Sinus  Interpretation: Sinus rhythm, PVCs, normal CT interval, normal QRS, normal axis, normal QT interval, no acute ST or T wave changes  Comparison: no previous EKG available   [JA]   0929 Unable to straight catheterize patient because they are unable to advance a [place]. Will place condom cath for UA [JA]   1103 Hospitalist was consulted. Bernardino Martinez accepted the patient for admission under Dr. Vanessa Nelson. [JA]      ED Course User Index  [JA] Vi Freed MD       New Prescriptions    No medications on file     Vi Freed MD    Counseling: The emergency provider has spoken with the patient and discussed todays results, in addition to providing specific details for the plan of care and counseling regarding the diagnosis and prognosis. Questions are answered at this time and they are agreeable with the plan.      --------------------------------- IMPRESSION AND DISPOSITION ---------------------------------    IMPRESSION  1. Acute respiratory failure with hypoxia ([address]    2. Influenza A    3. Metabolic encephalopathy        DISPOSITION  Disposition: Admit to telemetry  Patient condition is stable      NOTE: This report was transcribed using voice recognition software.  Every effort was made to ensure accuracy; however, inadvertent computerized transcription errors may be present    I, Vi Freed MD, am the primary provider of this record       Vi Freed MD  12/25/22 8537

## 2022-12-26 LAB
ANION GAP SERPL CALCULATED.3IONS-SCNC: 8 MMOL/L (ref 7–16)
BASOPHILS ABSOLUTE: 0 E9/L (ref 0–0.2)
BASOPHILS RELATIVE PERCENT: 0 % (ref 0–2)
BUN BLDV-MCNC: 25 MG/DL (ref 6–23)
BURR CELLS: ABNORMAL
CALCIUM SERPL-MCNC: 8.1 MG/DL (ref 8.6–10.2)
CHLORIDE BLD-SCNC: 107 MMOL/L (ref 98–107)
CO2: 24 MMOL/L (ref 22–29)
CREAT SERPL-MCNC: 0.8 MG/DL (ref 0.7–1.2)
EKG ATRIAL RATE: 99 BPM
EKG P AXIS: 66 DEGREES
EKG P-R INTERVAL: 182 MS
EKG Q-T INTERVAL: 362 MS
EKG QRS DURATION: 70 MS
EKG QTC CALCULATION (BAZETT): 464 MS
EKG R AXIS: 27 DEGREES
EKG T AXIS: 21 DEGREES
EKG VENTRICULAR RATE: 99 BPM
EOSINOPHILS ABSOLUTE: 0 E9/L (ref 0.05–0.5)
EOSINOPHILS RELATIVE PERCENT: 0 % (ref 0–6)
GFR SERPL CREATININE-BSD FRML MDRD: >60 ML/MIN/1.73
GLUCOSE BLD-MCNC: 180 MG/DL (ref 74–99)
HCT VFR BLD CALC: 41.4 % (ref 37–54)
HEMOGLOBIN: 13.6 G/DL (ref 12.5–16.5)
LYMPHOCYTES ABSOLUTE: 0.54 E9/L (ref 1.5–4)
LYMPHOCYTES RELATIVE PERCENT: 3.5 % (ref 20–42)
MCH RBC QN AUTO: 30.2 PG (ref 26–35)
MCHC RBC AUTO-ENTMCNC: 32.9 % (ref 32–34.5)
MCV RBC AUTO: 91.8 FL (ref 80–99.9)
MONOCYTES ABSOLUTE: 0.54 E9/L (ref 0.1–0.95)
MONOCYTES RELATIVE PERCENT: 3.5 % (ref 2–12)
NEUTROPHILS ABSOLUTE: 12.65 E9/L (ref 1.8–7.3)
NEUTROPHILS RELATIVE PERCENT: 93 % (ref 43–80)
NUCLEATED RED BLOOD CELLS: 0 /100 WBC
OVALOCYTES: ABNORMAL
PDW BLD-RTO: 13.4 FL (ref 11.5–15)
PHOSPHORUS: 2.8 MG/DL (ref 2.5–4.5)
PLATELET # BLD: 244 E9/L (ref 130–450)
PMV BLD AUTO: 10.3 FL (ref 7–12)
POLYCHROMASIA: ABNORMAL
POTASSIUM REFLEX MAGNESIUM: 4.2 MMOL/L (ref 3.5–5)
RBC # BLD: 4.51 E12/L (ref 3.8–5.8)
SODIUM BLD-SCNC: 139 MMOL/L (ref 132–146)
WBC # BLD: 13.6 E9/L (ref 4.5–11.5)

## 2022-12-26 PROCEDURE — 94667 MNPJ CHEST WALL 1ST: CPT

## 2022-12-26 PROCEDURE — 80048 BASIC METABOLIC PNL TOTAL CA: CPT

## 2022-12-26 PROCEDURE — 84100 ASSAY OF PHOSPHORUS: CPT

## 2022-12-26 PROCEDURE — 6370000000 HC RX 637 (ALT 250 FOR IP): Performed by: NURSE PRACTITIONER

## 2022-12-26 PROCEDURE — 6360000002 HC RX W HCPCS: Performed by: INTERNAL MEDICINE

## 2022-12-26 PROCEDURE — 36415 COLL VENOUS BLD VENIPUNCTURE: CPT

## 2022-12-26 PROCEDURE — 6370000000 HC RX 637 (ALT 250 FOR IP): Performed by: FAMILY MEDICINE

## 2022-12-26 PROCEDURE — 2580000003 HC RX 258: Performed by: FAMILY MEDICINE

## 2022-12-26 PROCEDURE — 2500000003 HC RX 250 WO HCPCS: Performed by: INTERNAL MEDICINE

## 2022-12-26 PROCEDURE — 2060000000 HC ICU INTERMEDIATE R&B

## 2022-12-26 PROCEDURE — 93005 ELECTROCARDIOGRAM TRACING: CPT | Performed by: INTERNAL MEDICINE

## 2022-12-26 PROCEDURE — 2700000000 HC OXYGEN THERAPY PER DAY

## 2022-12-26 PROCEDURE — 93010 ELECTROCARDIOGRAM REPORT: CPT | Performed by: INTERNAL MEDICINE

## 2022-12-26 PROCEDURE — 85025 COMPLETE CBC W/AUTO DIFF WBC: CPT

## 2022-12-26 PROCEDURE — 6360000002 HC RX W HCPCS: Performed by: FAMILY MEDICINE

## 2022-12-26 PROCEDURE — 94640 AIRWAY INHALATION TREATMENT: CPT

## 2022-12-26 PROCEDURE — 6370000000 HC RX 637 (ALT 250 FOR IP): Performed by: INTERNAL MEDICINE

## 2022-12-26 PROCEDURE — 94668 MNPJ CHEST WALL SBSQ: CPT

## 2022-12-26 RX ORDER — LABETALOL HYDROCHLORIDE 5 MG/ML
10 INJECTION, SOLUTION INTRAVENOUS ONCE
Status: COMPLETED | OUTPATIENT
Start: 2022-12-26 | End: 2022-12-26

## 2022-12-26 RX ORDER — METOPROLOL SUCCINATE 25 MG/1
25 TABLET, EXTENDED RELEASE ORAL DAILY
Status: DISCONTINUED | OUTPATIENT
Start: 2022-12-26 | End: 2022-12-31 | Stop reason: HOSPADM

## 2022-12-26 RX ORDER — IPRATROPIUM BROMIDE AND ALBUTEROL SULFATE 2.5; .5 MG/3ML; MG/3ML
1 SOLUTION RESPIRATORY (INHALATION) ONCE
Status: COMPLETED | OUTPATIENT
Start: 2022-12-26 | End: 2022-12-26

## 2022-12-26 RX ORDER — FUROSEMIDE 10 MG/ML
20 INJECTION INTRAMUSCULAR; INTRAVENOUS ONCE
Status: COMPLETED | OUTPATIENT
Start: 2022-12-26 | End: 2022-12-26

## 2022-12-26 RX ORDER — ACETYLCYSTEINE 100 MG/ML
600 SOLUTION ORAL; RESPIRATORY (INHALATION) 2 TIMES DAILY
Status: DISCONTINUED | OUTPATIENT
Start: 2022-12-26 | End: 2022-12-31 | Stop reason: HOSPADM

## 2022-12-26 RX ORDER — DILTIAZEM HYDROCHLORIDE 5 MG/ML
10 INJECTION INTRAVENOUS ONCE
Status: DISCONTINUED | OUTPATIENT
Start: 2022-12-26 | End: 2022-12-31 | Stop reason: HOSPADM

## 2022-12-26 RX ADMIN — DONEPEZIL HYDROCHLORIDE 10 MG: 10 TABLET, FILM COATED ORAL at 20:00

## 2022-12-26 RX ADMIN — PYRIDOSTIGMINE BROMIDE 60 MG: 60 TABLET ORAL at 20:01

## 2022-12-26 RX ADMIN — SODIUM CHLORIDE, PRESERVATIVE FREE 10 ML: 5 INJECTION INTRAVENOUS at 08:38

## 2022-12-26 RX ADMIN — ATORVASTATIN CALCIUM 40 MG: 40 TABLET, FILM COATED ORAL at 20:00

## 2022-12-26 RX ADMIN — IPRATROPIUM BROMIDE AND ALBUTEROL SULFATE 1 AMPULE: .5; 2.5 SOLUTION RESPIRATORY (INHALATION) at 05:03

## 2022-12-26 RX ADMIN — IPRATROPIUM BROMIDE AND ALBUTEROL SULFATE 1 AMPULE: .5; 2.5 SOLUTION RESPIRATORY (INHALATION) at 09:47

## 2022-12-26 RX ADMIN — IPRATROPIUM BROMIDE AND ALBUTEROL SULFATE 1 AMPULE: .5; 2.5 SOLUTION RESPIRATORY (INHALATION) at 12:38

## 2022-12-26 RX ADMIN — ACETAMINOPHEN 650 MG: 325 TABLET ORAL at 12:30

## 2022-12-26 RX ADMIN — LABETALOL HYDROCHLORIDE 10 MG: 5 INJECTION INTRAVENOUS at 14:50

## 2022-12-26 RX ADMIN — ACETYLCYSTEINE 600 MG: 100 SOLUTION ORAL; RESPIRATORY (INHALATION) at 12:38

## 2022-12-26 RX ADMIN — PREDNISONE 10 MG: 5 TABLET ORAL at 08:40

## 2022-12-26 RX ADMIN — SODIUM CHLORIDE, PRESERVATIVE FREE 10 ML: 5 INJECTION INTRAVENOUS at 20:01

## 2022-12-26 RX ADMIN — IPRATROPIUM BROMIDE AND ALBUTEROL SULFATE 1 AMPULE: .5; 2.5 SOLUTION RESPIRATORY (INHALATION) at 20:27

## 2022-12-26 RX ADMIN — OSELTAMIVIR PHOSPHATE 75 MG: 75 CAPSULE ORAL at 20:00

## 2022-12-26 RX ADMIN — METOPROLOL SUCCINATE 25 MG: 25 TABLET, EXTENDED RELEASE ORAL at 16:19

## 2022-12-26 RX ADMIN — ACETYLCYSTEINE 600 MG: 100 SOLUTION ORAL; RESPIRATORY (INHALATION) at 20:28

## 2022-12-26 RX ADMIN — ENOXAPARIN SODIUM 40 MG: 100 INJECTION SUBCUTANEOUS at 08:36

## 2022-12-26 RX ADMIN — IPRATROPIUM BROMIDE AND ALBUTEROL SULFATE 1 AMPULE: .5; 2.5 SOLUTION RESPIRATORY (INHALATION) at 16:27

## 2022-12-26 RX ADMIN — LABETALOL HYDROCHLORIDE 10 MG: 5 INJECTION INTRAVENOUS at 09:21

## 2022-12-26 RX ADMIN — AMLODIPINE BESYLATE 5 MG: 5 TABLET ORAL at 08:35

## 2022-12-26 RX ADMIN — OSELTAMIVIR PHOSPHATE 75 MG: 75 CAPSULE ORAL at 08:35

## 2022-12-26 RX ADMIN — FUROSEMIDE 20 MG: 10 INJECTION, SOLUTION INTRAMUSCULAR; INTRAVENOUS at 11:45

## 2022-12-26 RX ADMIN — PYRIDOSTIGMINE BROMIDE 60 MG: 60 TABLET ORAL at 08:36

## 2022-12-26 ASSESSMENT — PAIN SCALES - GENERAL: PAINLEVEL_OUTOF10: 0

## 2022-12-26 NOTE — PROGRESS NOTES
Left message for physician updating pt's condition- HR sustaining in 150's, /93 and fever, improvement in breathing

## 2022-12-26 NOTE — PROGRESS NOTES
Notified By CMR that pt HR is elevated 130s, EKG done previously this AM that showed SVT. Call placed to Dr Addy Mcelroy   to notify of the above.

## 2022-12-26 NOTE — H&P
[facility]  History and Physical      CHIEF COMPLAINT:    Chief Complaint   Patient presents with    Respiratory Distress     Pt from [facility] he is there for a hip injury he normally wears 2L of 02 pt was sating in the 80's per EMS. Ems put him on 4 L sating at 92%. Pt does have fever. Patient of Montrell Mcdonnell DO presents with:  Respiratory failure with hypoxia ([address]    History of Present Illness:   Patient is an 58-year-old female without a past medical history on file. Patient presented to the ED with complaints of fever and cough. Patient is from facility however he is confused. Patient is unable to provide any meaningful history. Apparently patient recently had hip surgery and he has been placed to [facility] for the past 5 weeks. Patient is normally on 2 L O2 but saturating in the 80s as his baseline and he was requiring additional oxygen needs. Patient has had a cough with fevers. Patient does have a history of myasthenia gravis. Patient is on chronic steroids. ER work-up revealed elevated , troponin 35, WBC 12.4, and positive for influenza A. Patient is admitted for further testing and treatment. On evaluation he appears extremely rhonchorous and short of breath. Denies any clinical shortness of breath however appears dyspneic. Unable to really answer too many questions appropriately. He tested positive for influenza    REVIEW OF SYSTEMS:  Pertinent negatives are above in HPI. 10 point ROS otherwise negative. History reviewed. No pertinent past medical history. History reviewed. No pertinent surgical history.     Medications Prior to Admission:    Medications Prior to Admission: acetaminophen (TYLENOL) 325 MG tablet, Take 650 mg by mouth every 6 hours as needed for Pain  aspirin 81 MG EC tablet, Take 81 mg by mouth daily  famotidine (PEPCID) 20 MG tablet, Take 20 mg by mouth 2 times daily  magnesium oxide (MAG-OX) 400 MG tablet, Take 400 mg by mouth daily  Multiple Vitamins-Minerals (THERAPEUTIC MULTIVITAMIN-MINERALS) tablet, Take 1 tablet by mouth daily  pyridoxine (B-6) 100 MG tablet, Take 100 mg by mouth daily  senna-docusate (PERICOLACE) 8.6-50 MG per tablet, Take 1 tablet by mouth in the morning and at bedtime  amLODIPine (NORVASC) 5 MG tablet, take 1 tablet by mouth once daily  cyanocobalamin 1000 MCG tablet, Cyanocobalamin (Vitamin B-12) (Vitamin B-12) 1,000 MCG Tablet Active 1000 MCG PO Daily May 3rd, 2021 7:09am  donepezil (ARICEPT) 10 MG tablet, take 1 tablet by mouth every morning  predniSONE (DELTASONE) 10 MG tablet, take 1 tablet by mouth once daily  pyridostigmine (MESTINON) 60 MG tablet, take 1 tablet by mouth twice a day  [DISCONTINUED] atorvastatin (LIPITOR) 20 MG tablet, take 1 tablet by mouth once daily  [DISCONTINUED] atorvastatin (LIPITOR) 40 MG tablet,   [DISCONTINUED] polyethylene glycol (GLYCOLAX) 17 GM/SCOOP powder, Polyethylene Glycol 3350 (Miralax) 17 GM Powd. Pack Active 17 GM PO Every Day May 8th, 2021 1:57pm  [DISCONTINUED] sildenafil (VIAGRA) 100 MG tablet, TAKE ONE TABLET BY MOUTH APPROXIMATELY ONE HOUR BEFORE SEXUAL ACTIVITY. DO NOT USE MORE THAN ONE DOSE DAILY    Note that the patient's home medications were reviewed and the above list is accurate to the best of my knowledge at the time of the exam.    Allergies:    Patient has no known allergies. Social History:    reports that he quit smoking about 54 years ago. His smoking use included cigarettes. He started smoking about 64 years ago. He has a 10.00 pack-year smoking history. He has never used smokeless tobacco.    Family History:   family history is not on file.       PHYSICAL EXAM:    Vitals:  BP (!) 143/93   Pulse 69   Temp 98.1 °F (36.7 °C)   Resp 20   Ht 5' 11\" (1.803 m)   Wt 177 lb (80.3 kg)   SpO2 92%   BMI 24.69 kg/m²       General appearance: NAD, conversant  Eyes: Sclerae anicteric, PERRLA  HEENT: AT/NC, MMM  Neck: FROM, supple, no thyromegaly  Lymph: No cervical / supraclavicular lymphadenopathy  Lungs: Coarse rhonchorous breath sounds bilateral lung fields  CV: RRR, no MRGs, no lower extremity edema  Abdomen: Soft, non-tender; no masses or HSM, +BS  Extremities: FROM without synovitis. No clubbing or cyanosis of the hands. Skin: no rash, induration, lesions, or ulcers  Psych: Calm and cooperative. Normal judgement and insight. Normal mood and affect. Neuro: Alert and interactive, face symmetric, speech fluent. LABS:  All labs reviewed. Of note:  CBC with Differential:    Lab Results   Component Value Date/Time    WBC 13.6 12/26/2022 04:45 AM    RBC 4.51 12/26/2022 04:45 AM    HGB 13.6 12/26/2022 04:45 AM    HCT 41.4 12/26/2022 04:45 AM     12/26/2022 04:45 AM    MCV 91.8 12/26/2022 04:45 AM    MCH 30.2 12/26/2022 04:45 AM    MCHC 32.9 12/26/2022 04:45 AM    RDW 13.4 12/26/2022 04:45 AM    NRBC 0.0 12/26/2022 04:45 AM    SEGSPCT 88.0 12/23/2021 06:01 AM    LYMPHOPCT 3.5 12/26/2022 04:45 AM    MONOPCT 3.5 12/26/2022 04:45 AM    BASOPCT 0.0 12/26/2022 04:45 AM    MONOSABS 0.54 12/26/2022 04:45 AM    LYMPHSABS 0.54 12/26/2022 04:45 AM    EOSABS 0.00 12/26/2022 04:45 AM    BASOSABS 0.00 12/26/2022 04:45 AM     CMP:    Lab Results   Component Value Date/Time     12/26/2022 04:45 AM    K 4.2 12/26/2022 04:45 AM     12/26/2022 04:45 AM    CO2 24 12/26/2022 04:45 AM    BUN 25 12/26/2022 04:45 AM    CREATININE 0.8 12/26/2022 04:45 AM    GFRAA >60 08/16/2021 05:50 AM    AGRATIO 0.9 12/22/2021 05:44 AM    LABGLOM >60 12/26/2022 04:45 AM    GLUCOSE 180 12/26/2022 04:45 AM    PROT 6.0 12/25/2022 08:25 AM    LABALBU 3.3 12/25/2022 08:25 AM    CALCIUM 8.1 12/26/2022 04:45 AM    BILITOT 0.5 12/25/2022 08:25 AM    ALKPHOS 67 12/25/2022 08:25 AM    AST 21 12/25/2022 08:25 AM    ALT 10 12/25/2022 08:25 AM       Imaging:  CXR: Mild cardiomegaly with mild CHF. Viral pneumonia is also a consideration.     CT head: No acute intracranial abnormality. There are persistent probable bilateral thalami and  Lacunar infarcts with periventricular chronic white matter changes. EKG:  I've personally reviewed the patient's EKG:  NSR    Telemetry:  I've personally reviewed the patient's telemetry:      ASSESSMENT/PLAN:  Principal Problem:    Respiratory failure with hypoxia ([address]  Resolved Problems:    * No resolved hospital problems. *    64-year-old male presents to the ED with complaints of shortness of breath and increased O2 needs and is admitted to telemetry unit with    Respiratory failure with hypoxia-likely related to influenza A  Supportive care  Supplement O2 to keep saturation greater than 93%. Patient currently requiring 4 L O2 as his baseline is 2 L. Tamiflu 75 mg twice daily for 5 days  DuoNebs every 4 hours while awake, add Mucomyst 600 inhaled to twice daily and chest PT  Discontinue IV fluids, 20 IV Lasix x1  Robitussin for cough  Droplet isolation  Follow-up chest x-ray in a.m. SVT/A. fib  Manifestation of underlying current acute lung disease  Continue to treat viral infection/pneumonia  Unable to currently give Cardizem secondary to borderline blood pressure  Continue to monitor once blood pressure is improved, initiate Cardizem drip if needed  If persistent tachycardia, will give digoxin for better control      Medication for other comorbidities continue as appropriate dose adjustment as necessary.     DVT prophylaxis  PT OT  Discharge planning      Code status: DNR CCA  Requires inpatient level of care    Dewayne Pennington MD  9:45 AM  12/26/2022

## 2022-12-26 NOTE — PROGRESS NOTES
Voice message left via perfect serve number for Dr. Hever Saxena re: patients SOB. Awaiting call back/ new orders.

## 2022-12-26 NOTE — PROGRESS NOTES
Spoke to Dr. Leona Mao new orders for labetolol 10mg IV once, and start metoprolol 25mg daily an hour after

## 2022-12-26 NOTE — PROGRESS NOTES
Notified  pt still SOB, crackles, tachycardia and tachypnic. News orders placed.  Ordered to give lasix 20mg IV and hold cardizem d/t /64

## 2022-12-27 ENCOUNTER — APPOINTMENT (OUTPATIENT)
Dept: GENERAL RADIOLOGY | Age: 82
End: 2022-12-27
Payer: MEDICARE

## 2022-12-27 LAB
ANION GAP SERPL CALCULATED.3IONS-SCNC: 10 MMOL/L (ref 7–16)
ATYPICAL LYMPHOCYTE RELATIVE PERCENT: 3.5 % (ref 0–4)
BASOPHILS ABSOLUTE: 0.1 E9/L (ref 0–0.2)
BASOPHILS RELATIVE PERCENT: 0.9 % (ref 0–2)
BUN BLDV-MCNC: 26 MG/DL (ref 6–23)
BURR CELLS: ABNORMAL
CALCIUM SERPL-MCNC: 8.4 MG/DL (ref 8.6–10.2)
CHLORIDE BLD-SCNC: 103 MMOL/L (ref 98–107)
CO2: 25 MMOL/L (ref 22–29)
CREAT SERPL-MCNC: 0.8 MG/DL (ref 0.7–1.2)
EKG ATRIAL RATE: 144 BPM
EKG Q-T INTERVAL: 286 MS
EKG QRS DURATION: 82 MS
EKG QTC CALCULATION (BAZETT): 445 MS
EKG T AXIS: -4 DEGREES
EKG VENTRICULAR RATE: 146 BPM
EOSINOPHILS ABSOLUTE: 0 E9/L (ref 0.05–0.5)
EOSINOPHILS RELATIVE PERCENT: 0 % (ref 0–6)
GFR SERPL CREATININE-BSD FRML MDRD: >60 ML/MIN/1.73
GLUCOSE BLD-MCNC: 116 MG/DL (ref 74–99)
HCT VFR BLD CALC: 42.3 % (ref 37–54)
HEMOGLOBIN: 13.9 G/DL (ref 12.5–16.5)
LYMPHOCYTES ABSOLUTE: 0.81 E9/L (ref 1.5–4)
LYMPHOCYTES RELATIVE PERCENT: 3.5 % (ref 20–42)
MAGNESIUM: 2.2 MG/DL (ref 1.6–2.6)
MCH RBC QN AUTO: 30 PG (ref 26–35)
MCHC RBC AUTO-ENTMCNC: 32.9 % (ref 32–34.5)
MCV RBC AUTO: 91.2 FL (ref 80–99.9)
MONOCYTES ABSOLUTE: 0.92 E9/L (ref 0.1–0.95)
MONOCYTES RELATIVE PERCENT: 7.8 % (ref 2–12)
NEUTROPHILS ABSOLUTE: 9.66 E9/L (ref 1.8–7.3)
NEUTROPHILS RELATIVE PERCENT: 84.3 % (ref 43–80)
NUCLEATED RED BLOOD CELLS: 0 /100 WBC
OVALOCYTES: ABNORMAL
PDW BLD-RTO: 13.7 FL (ref 11.5–15)
PHOSPHORUS: 2.5 MG/DL (ref 2.5–4.5)
PLATELET # BLD: 236 E9/L (ref 130–450)
PMV BLD AUTO: 10 FL (ref 7–12)
POIKILOCYTES: ABNORMAL
POLYCHROMASIA: ABNORMAL
POTASSIUM SERPL-SCNC: 4.8 MMOL/L (ref 3.5–5)
RBC # BLD: 4.64 E12/L (ref 3.8–5.8)
SODIUM BLD-SCNC: 138 MMOL/L (ref 132–146)
WBC # BLD: 11.5 E9/L (ref 4.5–11.5)

## 2022-12-27 PROCEDURE — 83735 ASSAY OF MAGNESIUM: CPT

## 2022-12-27 PROCEDURE — 6360000002 HC RX W HCPCS: Performed by: FAMILY MEDICINE

## 2022-12-27 PROCEDURE — 36415 COLL VENOUS BLD VENIPUNCTURE: CPT

## 2022-12-27 PROCEDURE — 2580000003 HC RX 258: Performed by: INTERNAL MEDICINE

## 2022-12-27 PROCEDURE — 2060000000 HC ICU INTERMEDIATE R&B

## 2022-12-27 PROCEDURE — 93010 ELECTROCARDIOGRAM REPORT: CPT | Performed by: INTERNAL MEDICINE

## 2022-12-27 PROCEDURE — 84100 ASSAY OF PHOSPHORUS: CPT

## 2022-12-27 PROCEDURE — 6370000000 HC RX 637 (ALT 250 FOR IP): Performed by: FAMILY MEDICINE

## 2022-12-27 PROCEDURE — 6360000002 HC RX W HCPCS: Performed by: INTERNAL MEDICINE

## 2022-12-27 PROCEDURE — 71045 X-RAY EXAM CHEST 1 VIEW: CPT

## 2022-12-27 PROCEDURE — 2700000000 HC OXYGEN THERAPY PER DAY

## 2022-12-27 PROCEDURE — 80048 BASIC METABOLIC PNL TOTAL CA: CPT

## 2022-12-27 PROCEDURE — 85025 COMPLETE CBC W/AUTO DIFF WBC: CPT

## 2022-12-27 PROCEDURE — 94668 MNPJ CHEST WALL SBSQ: CPT

## 2022-12-27 PROCEDURE — 2580000003 HC RX 258: Performed by: FAMILY MEDICINE

## 2022-12-27 PROCEDURE — 6370000000 HC RX 637 (ALT 250 FOR IP): Performed by: INTERNAL MEDICINE

## 2022-12-27 PROCEDURE — 94640 AIRWAY INHALATION TREATMENT: CPT

## 2022-12-27 PROCEDURE — 6370000000 HC RX 637 (ALT 250 FOR IP): Performed by: NURSE PRACTITIONER

## 2022-12-27 RX ADMIN — PIPERACILLIN AND TAZOBACTAM 4500 MG: 4; .5 INJECTION, POWDER, FOR SOLUTION INTRAVENOUS at 17:58

## 2022-12-27 RX ADMIN — ACETYLCYSTEINE 600 MG: 100 SOLUTION ORAL; RESPIRATORY (INHALATION) at 20:59

## 2022-12-27 RX ADMIN — ACETYLCYSTEINE 600 MG: 100 SOLUTION ORAL; RESPIRATORY (INHALATION) at 10:04

## 2022-12-27 RX ADMIN — SODIUM CHLORIDE, PRESERVATIVE FREE 10 ML: 5 INJECTION INTRAVENOUS at 09:09

## 2022-12-27 RX ADMIN — DONEPEZIL HYDROCHLORIDE 10 MG: 10 TABLET, FILM COATED ORAL at 20:00

## 2022-12-27 RX ADMIN — ATORVASTATIN CALCIUM 40 MG: 40 TABLET, FILM COATED ORAL at 20:00

## 2022-12-27 RX ADMIN — PREDNISONE 10 MG: 5 TABLET ORAL at 09:09

## 2022-12-27 RX ADMIN — PIPERACILLIN AND TAZOBACTAM 4500 MG: 4; .5 INJECTION, POWDER, FOR SOLUTION INTRAVENOUS at 23:48

## 2022-12-27 RX ADMIN — PYRIDOSTIGMINE BROMIDE 60 MG: 60 TABLET ORAL at 09:09

## 2022-12-27 RX ADMIN — PYRIDOSTIGMINE BROMIDE 60 MG: 60 TABLET ORAL at 20:00

## 2022-12-27 RX ADMIN — OSELTAMIVIR PHOSPHATE 75 MG: 75 CAPSULE ORAL at 09:09

## 2022-12-27 RX ADMIN — IPRATROPIUM BROMIDE AND ALBUTEROL SULFATE 1 AMPULE: .5; 2.5 SOLUTION RESPIRATORY (INHALATION) at 16:32

## 2022-12-27 RX ADMIN — IPRATROPIUM BROMIDE AND ALBUTEROL SULFATE 1 AMPULE: .5; 2.5 SOLUTION RESPIRATORY (INHALATION) at 14:19

## 2022-12-27 RX ADMIN — OSELTAMIVIR PHOSPHATE 75 MG: 75 CAPSULE ORAL at 20:00

## 2022-12-27 RX ADMIN — METOPROLOL SUCCINATE 25 MG: 25 TABLET, EXTENDED RELEASE ORAL at 09:08

## 2022-12-27 RX ADMIN — ENOXAPARIN SODIUM 40 MG: 100 INJECTION SUBCUTANEOUS at 09:09

## 2022-12-27 RX ADMIN — AMLODIPINE BESYLATE 5 MG: 5 TABLET ORAL at 09:09

## 2022-12-27 RX ADMIN — SODIUM CHLORIDE, PRESERVATIVE FREE 10 ML: 5 INJECTION INTRAVENOUS at 20:00

## 2022-12-27 RX ADMIN — IPRATROPIUM BROMIDE AND ALBUTEROL SULFATE 1 AMPULE: .5; 2.5 SOLUTION RESPIRATORY (INHALATION) at 20:59

## 2022-12-27 RX ADMIN — IPRATROPIUM BROMIDE AND ALBUTEROL SULFATE 1 AMPULE: .5; 2.5 SOLUTION RESPIRATORY (INHALATION) at 02:35

## 2022-12-27 RX ADMIN — IPRATROPIUM BROMIDE AND ALBUTEROL SULFATE 1 AMPULE: .5; 2.5 SOLUTION RESPIRATORY (INHALATION) at 10:04

## 2022-12-27 ASSESSMENT — PAIN SCALES - GENERAL
PAINLEVEL_OUTOF10: 0
PAINLEVEL_OUTOF10: 0

## 2022-12-27 NOTE — DISCHARGE INSTR - COC
Continuity of Care Form    Patient Name: Willow Pinto   :  1940  MRN:  [id number]    Admit date:  2022  Discharge date:  2022    Code Status Order: DNR-CCA   Advance Directives:     Admitting Physician:  Martina Almeida MD  PCP: Catherine Desir DO    Discharging Nurse: [id number] [employer] Unit/Room#: 5273/5053-T  Discharging Unit Phone Number: [phone]    Emergency Contact:   Extended Emergency Contact Information  Primary Emergency Contact: Luz Michelle  Mobile Phone: [phone]  Relation: Grandchild  Secondary Emergency Contact: [address] Phone: [phone]  Relation: Spouse  Preferred language: English   needed? No    Past Surgical History:  History reviewed. No pertinent surgical history.     Immunization History:   Immunization History   Administered Date(s) Administered    COVID-19, MODERNA BLUE border, Primary or Immunocompromised, (age 12y+), IM, 100 mcg/0.5mL 2021, 2021    COVID-19, PFIZER Bivalent BOOSTER, DO NOT Dilute, (age 12y+), IM, 27 mcg/0.3 mL 2022    Influenza Virus Vaccine 10/11/2012    Influenza, FLUAD, (age 72 y+), Adjuvanted, 0.5mL 2022    Influenza, FLUARIX, FLULAVAL, FLUZONE (age 10 mo+) AND AFLURIA, (age 1 y+), PF, 0.5mL 10/20/2021    Influenza, FLUZONE (age 72 y+), High Dose, 0.7mL 2021    Influenza, High Dose (Fluzone 65 yrs and older) 2018, 10/16/2019, 10/19/2020    Pneumococcal Polysaccharide (Fbsanzkjt94) 2007, 10/11/2012    Tdap (Boostrix, Adacel) 2020       Active Problems:  Patient Active Problem List   Diagnosis Code    History of CVA (cerebrovascular accident) Z86.73    History of hypertension Z86.79    Overactive bladder N32.81    Expressive dysphasia R47.02    Hypertension I10    Polyneuropathy G62.9    Prostate cancer ([facility] C61    Proximal muscle weakness M62.81    Unsteady gait when walking R26.81    Weakness R53.1    Respiratory failure with hypoxia (HCC) J96.91 Isolation/Infection:   Isolation            Droplet          Patient Infection Status       Infection Onset Added Last Indicated Last Indicated By Review Planned Expiration Resolved Resolved By    Influenza 12/25/22 12/25/22 12/25/22 RAPID INFLUENZA A/B ANTIGENS 01/01/23 01/04/23      Resolved    COVID-19 (Rule Out) 12/25/22 12/25/22 12/25/22 COVID-19, Rapid (Ordered)   12/25/22 Rule-Out Test Resulted            Nurse Assessment:  Last Vital Signs: BP (!) 144/82   Pulse 72   Temp 98 °F (36.7 °C) (Oral)   Resp 18   Ht 5' 11\" (1.803 m)   Wt 173 lb (78.5 kg)   SpO2 93%   BMI 24.13 kg/m²     Last documented pain score (0-10 scale): Pain Level: 0  Last Weight:   Wt Readings from Last 1 Encounters:   12/27/22 173 lb (78.5 kg)     Mental Status:  oriented and alert to person and time, unsure of place and situation    IV Access:  - None    Nursing Mobility/ADLs:  Walking   Assisted  Transfer  Assisted  Bathing  Assisted  Dressing  Assisted  Toileting  Assisted  Feeding  Independent  Med Admin  Assisted  Med Delivery   whole and one at a time    Wound Care Documentation and Therapy:  Wound 12/25/22 Coccyx (Active)   Wound Cleansed Cleansed with saline 12/25/22 2000   Dressing/Treatment Open to air 12/26/22 1943   Wound Length (cm) 2 cm 12/25/22 2000   Wound Width (cm) 1 cm 12/25/22 2000   Wound Surface Area (cm^2) 2 cm^2 12/25/22 2000   Wound Assessment Non-blanchable erythema;Purple/maroon 12/26/22 1943   Melly-wound Assessment Blanchable erythema;Dry/flaky 12/25/22 2000   Number of days: 1        Elimination:  Continence:    Bowel: Yes, incontinent at times  Bladder: No, was using external catheter here  Urinary Catheter: None   Colostomy/Ileostomy/Ileal Conduit: No       Date of Last BM: 12/30/22    Intake/Output Summary (Last 24 hours) at 12/27/2022 0997  Last data filed at 12/27/2022 0523  Gross per 24 hour   Intake --   Output 1300 ml   Net -1300 ml     I/O last 3 completed shifts:  In: -   Out: 1700 [GRVRQ:[id number]]    Safety Concerns:     None    Impairments/Disabilities:      None    Nutrition Therapy:  Current Nutrition Therapy:   - Oral Diet:  Dental Soft    Routes of Feeding: Oral  Liquids: Nectar Thick Liquids  Daily Fluid Restriction: no  Last Modified Barium Swallow with Video (Video Swallowing Test): done on 12/29    Treatments at the Time of Hospital Discharge:   Respiratory Treatments: breathing treatments  Oxygen Therapy:  is on oxygen at 2 L/min per nasal cannula.   Ventilator:    - No ventilator support    Rehab Therapies: Physical Therapy, Occupational Therapy, and Speech/Language Therapy  Weight Bearing Status/Restrictions: No weight bearing restrictions  Other Medical Equipment (for information only, NOT a DME order):  walker  Other Treatments: n/a    Patient's personal belongings (please select all that are sent with patient):  Dentures upper    RN SIGNATURE:  Electronically signed by Andra Terrell RN on 12/30/22 at 1:36 PM EST    CASE MANAGEMENT/SOCIAL WORK SECTION    Inpatient Status Date: ***    Readmission Risk Assessment Score:  Readmission Risk              Risk of Unplanned Readmission:  20           Discharging to Facility/ Agency   Name: [address], [address] [place] [address]:[phone]    Dialysis Facility (if applicable)   Name:  Address:  Dialysis Schedule:  Phone:  Fax:    / signature: {Esignature:[id number]}Electronically signed by KEV Lazaro on 12/27/22 at 9:29 AM EST     PHYSICIAN SECTION    Prognosis: {Prognosis:9328521343}    Condition at Discharge: Stable    Rehab Potential (if transferring to Rehab): {Prognosis:2417217998}    Recommended Labs or Other Treatments After Discharge: ***    Physician Certification: I certify the above information and transfer of Dianna Rob  is necessary for the continuing treatment of the diagnosis listed and that he requires [facility] 30 days.     Update Admission H&P: {CHP DME Changes in TNMQR:[id number]}    PHYSICIAN SIGNATURE:  {Esignature:[id number]}

## 2022-12-27 NOTE — PROGRESS NOTES
Pharmacy Note - Extended Infusion Beta-Lactam Adjustment    Piperacillin/Tazobactam 3375mg Q8h for treatment of Sepsis of unknown etiology. Per [facility] Extended Infusion Beta-Lactam Policy, piperacillin/tazobactam will be changed to 4500mg loading dose followed by 4500mg Q8h extended infusion    Estimated Creatinine Clearance: Estimated Creatinine Clearance: 73 mL/min (based on SCr of 0.8 mg/dL). BMI: Body mass index is 24.13 kg/m². Please call with any questions.     Thank you,    Florence Araujo, [facility]

## 2022-12-27 NOTE — PROGRESS NOTES
[facility]                                Progress note    Subjective: The patient is sleeping on evaluation  However not tachycardic today, breath sounds significantly improved      Objective:    /78   Pulse 84   Temp 98.5 °F (36.9 °C) (Oral)   Resp 18   Ht 5' 11\" (1.803 m)   Wt 173 lb (78.5 kg)   SpO2 93%   BMI 24.13 kg/m²     In: -   Out: 1300   In: -   Out: 1300 [Urine:1300]    General appearance: NAD, somnolent  HEENT: AT/NC, MMM  Neck: FROM, supple  Lungs: Coarse rhonchorous breath sounds bilateral lung fields  CV: RRR, no MRGs  Vasc: Radial pulses 2+  Abdomen: Soft, non-tender; no masses or HSM  Extremities: No peripheral edema or digital cyanosis  Skin: no rash, lesions or ulcers  Unable to fully assess psych and neuro eval's     Recent Labs     12/25/22  0825 12/26/22  0445 12/27/22  1302   WBC 12.4* 13.6* 11.5   HGB 14.1 13.6 13.9   HCT 42.8 41.4 42.3    244 236       Recent Labs     12/25/22  0825 12/26/22  0445 12/27/22  0700    139 138   K 4.0 4.2 4.8    107 103   CO2 26 24 25   BUN 21 25* 26*   CREATININE 0.9 0.8 0.8   CALCIUM 8.2* 8.1* 8.4*       Assessment:    Principal Problem:    Respiratory failure with hypoxia (HCC)  Resolved Problems:    * No resolved hospital problems. *      Plan:  20-year-old male presents to the ED with complaints of shortness of breath and increased O2 needs and is admitted to telemetry unit with     Respiratory failure with hypoxia-likely related to influenza A  -Supportive care  -Supplement O2 to keep saturation greater than 93%. Patient currently requiring 4 L O2 as his baseline is 2 L.  -Tamiflu 75 mg twice daily for 5 days  -DuoNebs every 4 hours while awake, add Mucomyst 600 inhaled to twice daily and chest PT  -Discontinue IV fluids, 20 IV Lasix x1  -Robitussin for cough  -Droplet isolation  -Follow-up chest x-ray in a.m.      SVT/A. fib  -Manifestation of underlying current acute lung disease  -Continue to treat viral infection/pneumonia  -Unable to currently give Cardizem secondary to borderline blood pressure  -Continue to monitor once blood pressure is improved, initiate Cardizem drip if needed  -If persistent tachycardia, will give digoxin for better control    12/27/2022:  -Follow-up chest x-ray pending-increased bibasilar atelectasis versus consolidation  -Patient currently still requiring 4L oxygen, satting 93%, baseline 2L NC   -TMAX 102.1 °F on admission, currently afebrile at 98 °F> Monitor for fevers   -Initiate empiric IV antibiotic therapy with Zosyn-White count has normalized today  -Continue tamiflu and breathing treatments   -Heart rate significantly improved today with initiation of Toprol-XL twice daily yesterday  -Continue nebs and respiratory hygiene initiated yesterday       Code status: DNR-CCA  Consultants: None    DVT Prophylaxis Lovenox  PT/OT  Discharge planning [facility]    Mark Alvarez MD

## 2022-12-27 NOTE — PLAN OF CARE
Problem: Skin/Tissue Integrity  Goal: Absence of new skin breakdown  Description: 1. Monitor for areas of redness and/or skin breakdown  2. Assess vascular access sites hourly  3. Every 4-6 hours minimum:  Change oxygen saturation probe site  4. Every 4-6 hours:  If on nasal continuous positive airway pressure, respiratory therapy assess nares and determine need for appliance change or resting period.   Outcome: Progressing     Problem: Safety - Adult  Goal: Free from fall injury  Outcome: Progressing     Problem: Pain  Goal: Verbalizes/displays adequate comfort level or baseline comfort level  Outcome: Progressing     Problem: ABCDS Injury Assessment  Goal: Absence of physical injury  Outcome: Progressing

## 2022-12-27 NOTE — CARE COORDINATION
Social Work / Discharge Planning : Patient was admitted from [facility]. SW left message for Eloisa Flores from [facility] to verify bed hold status. Await response. . N 17 generated and transport forms completed. SW to follow. Electronically signed by KEV Amin on 12/27/22 at 9:03 AM EST     Addendum: Kassy Espino from [place] [id number]. Verified patient is NOT a bed hold but can return. No pre-cert needed. SW to follow.  Electronically signed by KEV Amin on 12/27/22 at 9:05 AM EST

## 2022-12-28 LAB
ANION GAP SERPL CALCULATED.3IONS-SCNC: 8 MMOL/L (ref 7–16)
BASOPHILS ABSOLUTE: 0.03 E9/L (ref 0–0.2)
BASOPHILS RELATIVE PERCENT: 0.3 % (ref 0–2)
BUN BLDV-MCNC: 22 MG/DL (ref 6–23)
CALCIUM SERPL-MCNC: 8.5 MG/DL (ref 8.6–10.2)
CHLORIDE BLD-SCNC: 102 MMOL/L (ref 98–107)
CO2: 30 MMOL/L (ref 22–29)
CREAT SERPL-MCNC: 0.9 MG/DL (ref 0.7–1.2)
EOSINOPHILS ABSOLUTE: 0.1 E9/L (ref 0.05–0.5)
EOSINOPHILS RELATIVE PERCENT: 0.9 % (ref 0–6)
GFR SERPL CREATININE-BSD FRML MDRD: >60 ML/MIN/1.73
GLUCOSE BLD-MCNC: 126 MG/DL (ref 74–99)
HCT VFR BLD CALC: 44 % (ref 37–54)
HEMOGLOBIN: 14.2 G/DL (ref 12.5–16.5)
IMMATURE GRANULOCYTES #: 0.08 E9/L
IMMATURE GRANULOCYTES %: 0.7 % (ref 0–5)
LYMPHOCYTES ABSOLUTE: 0.48 E9/L (ref 1.5–4)
LYMPHOCYTES RELATIVE PERCENT: 4.2 % (ref 20–42)
MCH RBC QN AUTO: 29.9 PG (ref 26–35)
MCHC RBC AUTO-ENTMCNC: 32.3 % (ref 32–34.5)
MCV RBC AUTO: 92.6 FL (ref 80–99.9)
MONOCYTES ABSOLUTE: 0.94 E9/L (ref 0.1–0.95)
MONOCYTES RELATIVE PERCENT: 8.2 % (ref 2–12)
NEUTROPHILS ABSOLUTE: 9.78 E9/L (ref 1.8–7.3)
NEUTROPHILS RELATIVE PERCENT: 85.7 % (ref 43–80)
OVALOCYTES: ABNORMAL
PDW BLD-RTO: 13.5 FL (ref 11.5–15)
PLATELET # BLD: 251 E9/L (ref 130–450)
PMV BLD AUTO: 10.2 FL (ref 7–12)
POIKILOCYTES: ABNORMAL
POLYCHROMASIA: ABNORMAL
POTASSIUM SERPL-SCNC: 3.5 MMOL/L (ref 3.5–5)
RBC # BLD: 4.75 E12/L (ref 3.8–5.8)
SODIUM BLD-SCNC: 140 MMOL/L (ref 132–146)
WBC # BLD: 11.4 E9/L (ref 4.5–11.5)

## 2022-12-28 PROCEDURE — 2580000003 HC RX 258: Performed by: FAMILY MEDICINE

## 2022-12-28 PROCEDURE — 94640 AIRWAY INHALATION TREATMENT: CPT

## 2022-12-28 PROCEDURE — 6370000000 HC RX 637 (ALT 250 FOR IP): Performed by: NURSE PRACTITIONER

## 2022-12-28 PROCEDURE — 6370000000 HC RX 637 (ALT 250 FOR IP): Performed by: FAMILY MEDICINE

## 2022-12-28 PROCEDURE — 6360000002 HC RX W HCPCS: Performed by: FAMILY MEDICINE

## 2022-12-28 PROCEDURE — 2060000000 HC ICU INTERMEDIATE R&B

## 2022-12-28 PROCEDURE — 80048 BASIC METABOLIC PNL TOTAL CA: CPT

## 2022-12-28 PROCEDURE — 2580000003 HC RX 258: Performed by: INTERNAL MEDICINE

## 2022-12-28 PROCEDURE — 36415 COLL VENOUS BLD VENIPUNCTURE: CPT

## 2022-12-28 PROCEDURE — 6370000000 HC RX 637 (ALT 250 FOR IP): Performed by: INTERNAL MEDICINE

## 2022-12-28 PROCEDURE — 94668 MNPJ CHEST WALL SBSQ: CPT

## 2022-12-28 PROCEDURE — 6360000002 HC RX W HCPCS: Performed by: INTERNAL MEDICINE

## 2022-12-28 PROCEDURE — 2500000003 HC RX 250 WO HCPCS: Performed by: NURSE PRACTITIONER

## 2022-12-28 PROCEDURE — 85025 COMPLETE CBC W/AUTO DIFF WBC: CPT

## 2022-12-28 PROCEDURE — 2700000000 HC OXYGEN THERAPY PER DAY

## 2022-12-28 RX ORDER — METOPROLOL TARTRATE 5 MG/5ML
5 INJECTION INTRAVENOUS ONCE
Status: COMPLETED | OUTPATIENT
Start: 2022-12-28 | End: 2022-12-28

## 2022-12-28 RX ADMIN — PIPERACILLIN AND TAZOBACTAM 4500 MG: 4; .5 INJECTION, POWDER, FOR SOLUTION INTRAVENOUS at 16:25

## 2022-12-28 RX ADMIN — ENOXAPARIN SODIUM 40 MG: 100 INJECTION SUBCUTANEOUS at 08:59

## 2022-12-28 RX ADMIN — METOPROLOL TARTRATE 5 MG: 5 INJECTION, SOLUTION INTRAVENOUS at 13:09

## 2022-12-28 RX ADMIN — IPRATROPIUM BROMIDE AND ALBUTEROL SULFATE 1 AMPULE: .5; 2.5 SOLUTION RESPIRATORY (INHALATION) at 16:11

## 2022-12-28 RX ADMIN — PYRIDOSTIGMINE BROMIDE 60 MG: 60 TABLET ORAL at 08:59

## 2022-12-28 RX ADMIN — OSELTAMIVIR PHOSPHATE 75 MG: 75 CAPSULE ORAL at 08:59

## 2022-12-28 RX ADMIN — SODIUM CHLORIDE, PRESERVATIVE FREE 10 ML: 5 INJECTION INTRAVENOUS at 08:59

## 2022-12-28 RX ADMIN — SODIUM CHLORIDE, PRESERVATIVE FREE 10 ML: 5 INJECTION INTRAVENOUS at 20:55

## 2022-12-28 RX ADMIN — IPRATROPIUM BROMIDE AND ALBUTEROL SULFATE 1 AMPULE: .5; 2.5 SOLUTION RESPIRATORY (INHALATION) at 20:02

## 2022-12-28 RX ADMIN — AMLODIPINE BESYLATE 5 MG: 5 TABLET ORAL at 08:59

## 2022-12-28 RX ADMIN — DONEPEZIL HYDROCHLORIDE 10 MG: 10 TABLET, FILM COATED ORAL at 20:55

## 2022-12-28 RX ADMIN — IPRATROPIUM BROMIDE AND ALBUTEROL SULFATE 1 AMPULE: .5; 2.5 SOLUTION RESPIRATORY (INHALATION) at 09:15

## 2022-12-28 RX ADMIN — ACETYLCYSTEINE 600 MG: 100 SOLUTION ORAL; RESPIRATORY (INHALATION) at 20:02

## 2022-12-28 RX ADMIN — OSELTAMIVIR PHOSPHATE 75 MG: 75 CAPSULE ORAL at 20:55

## 2022-12-28 RX ADMIN — ATORVASTATIN CALCIUM 40 MG: 40 TABLET, FILM COATED ORAL at 20:55

## 2022-12-28 RX ADMIN — PYRIDOSTIGMINE BROMIDE 60 MG: 60 TABLET ORAL at 20:55

## 2022-12-28 RX ADMIN — IPRATROPIUM BROMIDE AND ALBUTEROL SULFATE 1 AMPULE: .5; 2.5 SOLUTION RESPIRATORY (INHALATION) at 12:01

## 2022-12-28 RX ADMIN — PIPERACILLIN AND TAZOBACTAM 4500 MG: 4; .5 INJECTION, POWDER, FOR SOLUTION INTRAVENOUS at 08:57

## 2022-12-28 RX ADMIN — METOPROLOL SUCCINATE 25 MG: 25 TABLET, EXTENDED RELEASE ORAL at 08:59

## 2022-12-28 RX ADMIN — PREDNISONE 10 MG: 5 TABLET ORAL at 08:59

## 2022-12-28 RX ADMIN — ACETYLCYSTEINE 600 MG: 100 SOLUTION ORAL; RESPIRATORY (INHALATION) at 09:15

## 2022-12-28 ASSESSMENT — PAIN SCALES - GENERAL
PAINLEVEL_OUTOF10: 0

## 2022-12-28 NOTE — PROGRESS NOTES
Inpatient Wound Care    Admit Date: 12/25/2022  8:03 AM    Reason for consult:  excoriation buttocks/upper back of legs    Significant history:  from [facility]  Adm with respiratory failure with hypoxia  Recent rt hip surgery- rt anterior hip incision ends with yellow  No drainage      Findings:  awake verbal  Visitors present  Sacral coccyx buttocks area.  Hermann, purplish areas    Heels intact per staff        Interventions in place:  SOS    Plan:lo air loss      Barbara Clay RN 12/28/2022 3:20 PM

## 2022-12-28 NOTE — PROGRESS NOTES
[facility]                                Progress note    Subjective:    Clinically he is markedly improved today  Awake alert and answering questions      Objective:    /74   Pulse (!) 101   Temp 97.3 °F (36.3 °C) (Oral)   Resp 18   Ht 5' 11\" (1.803 m)   Wt 175 lb 3.2 oz (79.5 kg)   SpO2 92%   BMI 24.44 kg/m²     In: -   Out: 800   In: -   Out: 800 [Urine:800]    General appearance: NAD, awake answering questions indicates he feels better  HEENT: AT/NC, MMM  Neck: FROM, supple  Lungs: Coarse rhonchorous breath sounds bilateral lung fields -improved  CV: RRR, no MRGs  Vasc: Radial pulses 2+  Abdomen: Soft, non-tender; no masses or HSM  Extremities: No peripheral edema or digital cyanosis  Skin: no rash, lesions or ulcers  Unable to fully assess psych and neuro eval's     Recent Labs     12/26/22  0445 12/27/22  1302 12/28/22  0247   WBC 13.6* 11.5 11.4   HGB 13.6 13.9 14.2   HCT 41.4 42.3 44.0    236 251       Recent Labs     12/26/22  0445 12/27/22  0700 12/28/22  0247    138 140   K 4.2 4.8 3.5    103 102   CO2 24 25 30*   BUN 25* 26* 22   CREATININE 0.8 0.8 0.9   CALCIUM 8.1* 8.4* 8.5*       Assessment:    Principal Problem:    Respiratory failure with hypoxia (HCC)  Resolved Problems:    * No resolved hospital problems. *      Plan:  33-year-old male presents to the ED with complaints of shortness of breath and increased O2 needs and is admitted to telemetry unit with     Respiratory failure with hypoxia-likely related to influenza A  -Supportive care  -Supplement O2 to keep saturation greater than 93%. Patient currently requiring 4 L O2 as his baseline is 2 L.  -Tamiflu 75 mg twice daily for 5 days  -DuoNebs every 4 hours while awake, add Mucomyst 600 inhaled to twice daily and chest PT  -Discontinue IV fluids, 20 IV Lasix x1  -Robitussin for cough  -Droplet isolation  -Follow-up chest x-ray in a.m.      SVT/A. fib  -Manifestation of underlying current acute lung disease  -Continue to treat viral infection/pneumonia  -Unable to currently give Cardizem secondary to borderline blood pressure  -Continue to monitor once blood pressure is improved, initiate Cardizem drip if needed  -If persistent tachycardia, will give digoxin for better control    12/27/2022:  -Follow-up chest x-ray pending-increased bibasilar atelectasis versus consolidation  -Patient currently still requiring 4L oxygen, satting 93%, baseline 2L NC   -TMAX 102.1 °F on admission, currently afebrile at 98 °F> Monitor for fevers   -Initiate empiric IV antibiotic therapy with Zosyn-White count has normalized today  -Continue tamiflu and breathing treatments   -Heart rate significantly improved today with initiation of Toprol-XL twice daily yesterday  -Continue nebs and respiratory hygiene initiated yesterday    12/28/2022  Status markedly improved today  Continue Zosyn to complete at least a 3-day course  Obtain swallow evaluation as he may be aspirating  Lung sounds improved  Heart rate controlled  Case discussed with son at bedside  DC plan next 48 hours     Code status: DNR-CCA  Consultants: None    DVT Prophylaxis Lovenox  PT/OT  Discharge planning [facility]    Yandy Cortes MD

## 2022-12-29 ENCOUNTER — APPOINTMENT (OUTPATIENT)
Dept: GENERAL RADIOLOGY | Age: 82
End: 2022-12-29
Payer: MEDICARE

## 2022-12-29 LAB
ANION GAP SERPL CALCULATED.3IONS-SCNC: 8 MMOL/L (ref 7–16)
BASOPHILS ABSOLUTE: 0.05 E9/L (ref 0–0.2)
BASOPHILS RELATIVE PERCENT: 0.5 % (ref 0–2)
BUN BLDV-MCNC: 21 MG/DL (ref 6–23)
BURR CELLS: ABNORMAL
CALCIUM SERPL-MCNC: 8.6 MG/DL (ref 8.6–10.2)
CHLORIDE BLD-SCNC: 103 MMOL/L (ref 98–107)
CO2: 29 MMOL/L (ref 22–29)
CREAT SERPL-MCNC: 1 MG/DL (ref 0.7–1.2)
EOSINOPHILS ABSOLUTE: 0.24 E9/L (ref 0.05–0.5)
EOSINOPHILS RELATIVE PERCENT: 2.2 % (ref 0–6)
GFR SERPL CREATININE-BSD FRML MDRD: >60 ML/MIN/1.73
GLUCOSE BLD-MCNC: 125 MG/DL (ref 74–99)
HCT VFR BLD CALC: 43.5 % (ref 37–54)
HEMOGLOBIN: 13.7 G/DL (ref 12.5–16.5)
IMMATURE GRANULOCYTES #: 0.17 E9/L
IMMATURE GRANULOCYTES %: 1.5 % (ref 0–5)
LYMPHOCYTES ABSOLUTE: 0.41 E9/L (ref 1.5–4)
LYMPHOCYTES RELATIVE PERCENT: 3.7 % (ref 20–42)
MCH RBC QN AUTO: 29.4 PG (ref 26–35)
MCHC RBC AUTO-ENTMCNC: 31.5 % (ref 32–34.5)
MCV RBC AUTO: 93.3 FL (ref 80–99.9)
MONOCYTES ABSOLUTE: 0.63 E9/L (ref 0.1–0.95)
MONOCYTES RELATIVE PERCENT: 5.7 % (ref 2–12)
NEUTROPHILS ABSOLUTE: 9.61 E9/L (ref 1.8–7.3)
NEUTROPHILS RELATIVE PERCENT: 86.4 % (ref 43–80)
OVALOCYTES: ABNORMAL
PDW BLD-RTO: 13.6 FL (ref 11.5–15)
PLATELET # BLD: 269 E9/L (ref 130–450)
PMV BLD AUTO: 10.2 FL (ref 7–12)
POIKILOCYTES: ABNORMAL
POTASSIUM SERPL-SCNC: 4.2 MMOL/L (ref 3.5–5)
RBC # BLD: 4.66 E12/L (ref 3.8–5.8)
SODIUM BLD-SCNC: 140 MMOL/L (ref 132–146)
WBC # BLD: 11.1 E9/L (ref 4.5–11.5)

## 2022-12-29 PROCEDURE — 2700000000 HC OXYGEN THERAPY PER DAY

## 2022-12-29 PROCEDURE — 94640 AIRWAY INHALATION TREATMENT: CPT

## 2022-12-29 PROCEDURE — 92611 MOTION FLUOROSCOPY/SWALLOW: CPT | Performed by: SPEECH-LANGUAGE PATHOLOGIST

## 2022-12-29 PROCEDURE — 94668 MNPJ CHEST WALL SBSQ: CPT

## 2022-12-29 PROCEDURE — 74230 X-RAY XM SWLNG FUNCJ C+: CPT

## 2022-12-29 PROCEDURE — 2580000003 HC RX 258: Performed by: INTERNAL MEDICINE

## 2022-12-29 PROCEDURE — 36415 COLL VENOUS BLD VENIPUNCTURE: CPT

## 2022-12-29 PROCEDURE — 6360000002 HC RX W HCPCS: Performed by: FAMILY MEDICINE

## 2022-12-29 PROCEDURE — 6370000000 HC RX 637 (ALT 250 FOR IP): Performed by: NURSE PRACTITIONER

## 2022-12-29 PROCEDURE — 2500000003 HC RX 250 WO HCPCS: Performed by: INTERNAL MEDICINE

## 2022-12-29 PROCEDURE — 2060000000 HC ICU INTERMEDIATE R&B

## 2022-12-29 PROCEDURE — 85025 COMPLETE CBC W/AUTO DIFF WBC: CPT

## 2022-12-29 PROCEDURE — 6370000000 HC RX 637 (ALT 250 FOR IP): Performed by: FAMILY MEDICINE

## 2022-12-29 PROCEDURE — 6370000000 HC RX 637 (ALT 250 FOR IP): Performed by: INTERNAL MEDICINE

## 2022-12-29 PROCEDURE — 80048 BASIC METABOLIC PNL TOTAL CA: CPT

## 2022-12-29 PROCEDURE — 6360000002 HC RX W HCPCS: Performed by: INTERNAL MEDICINE

## 2022-12-29 PROCEDURE — 2580000003 HC RX 258: Performed by: FAMILY MEDICINE

## 2022-12-29 PROCEDURE — 92526 ORAL FUNCTION THERAPY: CPT | Performed by: SPEECH-LANGUAGE PATHOLOGIST

## 2022-12-29 RX ADMIN — BARIUM SULFATE 70 ML: 0.81 POWDER, FOR SUSPENSION ORAL at 12:41

## 2022-12-29 RX ADMIN — ACETYLCYSTEINE 600 MG: 100 SOLUTION ORAL; RESPIRATORY (INHALATION) at 09:33

## 2022-12-29 RX ADMIN — BARIUM SULFATE 10 ML: 400 PASTE ORAL at 12:41

## 2022-12-29 RX ADMIN — BARIUM SULFATE 120 ML: 400 SUSPENSION ORAL at 12:41

## 2022-12-29 RX ADMIN — PIPERACILLIN AND TAZOBACTAM 4500 MG: 4; .5 INJECTION, POWDER, FOR SOLUTION INTRAVENOUS at 09:01

## 2022-12-29 RX ADMIN — OSELTAMIVIR PHOSPHATE 75 MG: 75 CAPSULE ORAL at 08:55

## 2022-12-29 RX ADMIN — AMLODIPINE BESYLATE 5 MG: 5 TABLET ORAL at 08:55

## 2022-12-29 RX ADMIN — IPRATROPIUM BROMIDE AND ALBUTEROL SULFATE 1 AMPULE: .5; 2.5 SOLUTION RESPIRATORY (INHALATION) at 21:18

## 2022-12-29 RX ADMIN — SODIUM CHLORIDE, PRESERVATIVE FREE 10 ML: 5 INJECTION INTRAVENOUS at 20:22

## 2022-12-29 RX ADMIN — ACETYLCYSTEINE 600 MG: 100 SOLUTION ORAL; RESPIRATORY (INHALATION) at 21:18

## 2022-12-29 RX ADMIN — IPRATROPIUM BROMIDE AND ALBUTEROL SULFATE 1 AMPULE: .5; 2.5 SOLUTION RESPIRATORY (INHALATION) at 13:19

## 2022-12-29 RX ADMIN — DONEPEZIL HYDROCHLORIDE 10 MG: 10 TABLET, FILM COATED ORAL at 20:22

## 2022-12-29 RX ADMIN — PYRIDOSTIGMINE BROMIDE 60 MG: 60 TABLET ORAL at 08:55

## 2022-12-29 RX ADMIN — PREDNISONE 10 MG: 5 TABLET ORAL at 08:55

## 2022-12-29 RX ADMIN — OSELTAMIVIR PHOSPHATE 75 MG: 75 CAPSULE ORAL at 20:22

## 2022-12-29 RX ADMIN — PYRIDOSTIGMINE BROMIDE 60 MG: 60 TABLET ORAL at 20:22

## 2022-12-29 RX ADMIN — ENOXAPARIN SODIUM 40 MG: 100 INJECTION SUBCUTANEOUS at 08:55

## 2022-12-29 RX ADMIN — PIPERACILLIN AND TAZOBACTAM 4500 MG: 4; .5 INJECTION, POWDER, FOR SOLUTION INTRAVENOUS at 00:26

## 2022-12-29 RX ADMIN — IPRATROPIUM BROMIDE AND ALBUTEROL SULFATE 1 AMPULE: .5; 2.5 SOLUTION RESPIRATORY (INHALATION) at 09:33

## 2022-12-29 RX ADMIN — ATORVASTATIN CALCIUM 40 MG: 40 TABLET, FILM COATED ORAL at 20:22

## 2022-12-29 RX ADMIN — PIPERACILLIN AND TAZOBACTAM 4500 MG: 4; .5 INJECTION, POWDER, FOR SOLUTION INTRAVENOUS at 17:03

## 2022-12-29 RX ADMIN — METOPROLOL SUCCINATE 25 MG: 25 TABLET, EXTENDED RELEASE ORAL at 08:55

## 2022-12-29 RX ADMIN — SODIUM CHLORIDE, PRESERVATIVE FREE 10 ML: 5 INJECTION INTRAVENOUS at 08:58

## 2022-12-29 RX ADMIN — IPRATROPIUM BROMIDE AND ALBUTEROL SULFATE 1 AMPULE: .5; 2.5 SOLUTION RESPIRATORY (INHALATION) at 17:19

## 2022-12-29 ASSESSMENT — PAIN SCALES - GENERAL
PAINLEVEL_OUTOF10: 0

## 2022-12-29 NOTE — PROGRESS NOTES
[facility]                                Progress note    Subjective:    Clinically he is markedly improved today  Awake alert and answering questions  Resting comfortably no acute distress      Objective:    /82   Pulse 53   Temp 98 °F (36.7 °C) (Oral)   Resp 18   Ht 5' 11\" (1.803 m)   Wt 179 lb 4.8 oz (81.3 kg)   SpO2 95%   BMI 25.01 kg/m²     In: -   Out: 1150   In: -   Out: 1150 [Urine:1150]    General appearance: NAD, awake answering questions indicates he feels better  HEENT: AT/NC, MMM  Neck: FROM, supple  Lungs: Coarse rhonchorous breath sounds bilateral lung fields -improved  CV: RRR, no MRGs  Vasc: Radial pulses 2+  Abdomen: Soft, non-tender; no masses or HSM  Extremities: No peripheral edema or digital cyanosis  Skin: no rash, lesions or ulcers  Unable to fully assess psych and neuro eval's     Recent Labs     12/27/22  1302 12/28/22  0247 12/29/22  0418   WBC 11.5 11.4 11.1   HGB 13.9 14.2 13.7   HCT 42.3 44.0 43.5    251 269         Recent Labs     12/27/22  0700 12/28/22  0247 12/29/22  0418    140 140   K 4.8 3.5 4.2    102 103   CO2 25 30* 29   BUN 26* 22 21   CREATININE 0.8 0.9 1.0   CALCIUM 8.4* 8.5* 8.6         Assessment:    Principal Problem:    Respiratory failure with hypoxia (HCC)  Resolved Problems:    * No resolved hospital problems. *      Plan:  77-year-old male presents to the ED with complaints of shortness of breath and increased O2 needs and is admitted to telemetry unit with     Respiratory failure with hypoxia-likely related to influenza A  -Supportive care  -Supplement O2 to keep saturation greater than 93%. Patient currently requiring 4 L O2 as his baseline is 2 L.  -Tamiflu 75 mg twice daily for 5 days  -DuoNebs every 4 hours while awake, add Mucomyst 600 inhaled to twice daily and chest PT  -Discontinue IV fluids, 20 IV Lasix x1  -Robitussin for cough  -Droplet isolation  -Follow-up chest x-ray in a.m. SVT/A. fib  -Manifestation of underlying current acute lung disease  -Continue to treat viral infection/pneumonia  -Unable to currently give Cardizem secondary to borderline blood pressure  -Continue to monitor once blood pressure is improved, initiate Cardizem drip if needed  -If persistent tachycardia, will give digoxin for better control    12/27/2022:  -Follow-up chest x-ray pending-increased bibasilar atelectasis versus consolidation  -Patient currently still requiring 4L oxygen, satting 93%, baseline 2L NC   -TMAX 102.1 °F on admission, currently afebrile at 98 °F> Monitor for fevers   -Initiate empiric IV antibiotic therapy with Zosyn-White count has normalized today  -Continue tamiflu and breathing treatments   -Heart rate significantly improved today with initiation of Toprol-XL twice daily yesterday  -Continue nebs and respiratory hygiene initiated yesterday    12/28/2022  Status markedly improved today  Continue Zosyn to complete at least a 3-day course  Obtain swallow evaluation as he may be aspirating  Lung sounds improved  Heart rate controlled  Case discussed with son at bedside  DC plan next 48 hours    12/29/22:  -Currently down to 3 L nasal cannula satting 93%, almost at baseline  -Continue IV Zosyn  -Await MBS for diet recs   -d/c plan in the next 24 hours      Code status: DNR-CCA  Consultants: None    DVT Prophylaxis Lovenox  PT/OT  Discharge planning [facility]    Dewayne Pennington MD

## 2022-12-29 NOTE — CARE COORDINATION
Social Work / Discharge planning : Patient admitted from [facility]. SW spoke to patient spouse Sandrita Witt [phone] and she did verify plan is to return to [facility] at discharge. No pre-cert needed. Await therapy input. SW to follow.  Electronically signed by KEV Gasca on 12/29/22 at 10:21 AM EST

## 2022-12-29 NOTE — PROGRESS NOTES
SPEECH/LANGUAGE PATHOLOGY  VIDEOFLUOROSCOPIC STUDY OF SWALLOWING (MBS)   and PLAN OF CARE    PATIENT NAME:  Rios Rodriguez  (male)     MRN:  [id number]    :  1940  (78 y.o.)  STATUS:  Inpatient: Room 0646/0646-A    TODAY'S DATE:  2022  REFERRING PROVIDER:  22 [id number]    SLP video swallow  Start:  22,   End:  22,   ONE TIME,   Standing Count:  1 Occurrences,   R         Marigene Kehr, MD    REASON FOR REFERRAL: assess for aspiration   EVALUATING THERAPIST: Raquel Clark, SLP      RESULTS:      DYSPHAGIA DIAGNOSIS:  moderate oropharyngeal phase dysphagia including significant aspiration of thin liquids    DIET RECOMMENDATIONS:  Easy to chew consistency solids (IDDSI level 7, transitional) with  SMALL SIPS of nectar consistency (mildly thick - IDDSI level 2) liquids    MEDICATION ADMINISTRATION: Administer medication whole, ONE AT A TIME, with sips of thickened liquid or Administer medication whole, ONE AT A TIME, in pudding/applesauce    FEEDING RECOMMENDATIONS:    Assistance level:  Stand by assistance is needed during all oral intake     Compensatory strategies recommended: Small bites/sips     Discussed recommendations with nursing and/or faxed report to referring provider: Yes    Laryngeal Penetration and Aspiration:  Penetration WITH aspiration was observed in today's study with  thin liquid    SPEECH THERAPY  PLAN OF CARE   The dysphagia POC is established based on physician order and dysphagia diagnosis    Skilled SLP intervention for dysphagia management on acute care 3-5 x per week until goals met, pt plateaus in function and/or discharged from hospital      Conditions Requiring Skilled Therapeutic Intervention for dysphagia:    Patient is performing below functional baseline d/t  current acute condition, Multiple diagnoses, multiple medications, and increased dependency upon caregivers.   Reduced laryngeal closure resulting in penetration  Reduced laryngeal closure resulting in aspiration   Swallow triggered when bolus head at level of pyriform sinus increasing risk of aspiration    SPECIFIC DYSPHAGIA INTERVENTIONS TO INCLUDE:     Compensatory strategy training   Therapeutic exercises  Trials of upgraded diet/liquid     Specific instructions for next treatment:  therapeutic po trial to determine safety of advanced diet textures and consistencies, initiate instruction of therapeutic exercises , and initiate instruction of compensatory strategies  Treatment Goals:    Short Term Goals:  Pt will implement identified compensatory swallowing strategies on 90% of opportunities or greater to improve airway protection and swallow function. Pt will complete PO trials of upgraded diet textures with SLP only to determine the least restrictive PO diet to maintain adequate nutrition/hydration with no more than 1 overt s/s of pen/asp. Pt will participate in DPNS to address functional deficits identified during swallow evaluation    Long Term Goals:   Pt will maintain adequate nutrition/hydration via PO intake of the least restrictive oral diet with implementation of safe swallow/ compensatory strategies and decrease signs/symptoms of aspiration to less than 1 x/day. Pt will improve oropharyngeal swallow function to ensure airway protection during PO intake to maintain adequate nutrition/hydration and decrease signs/symptoms of aspiration to less than 1 x/day. Patient/family Goal:    Did not state. Will further assess during treatment.     Plan of care discussed with Patient   The Patient did not demonstrate complete understanding of the diagnosis, prognosis and plan of care     Rehabilitation Potential/Prognosis: fair                      ADMITTING DIAGNOSIS: Metabolic encephalopathy [L64.18]  Influenza A [J10.1]  Acute respiratory failure with hypoxia ([address] [J96.01]  Respiratory failure with hypoxia ([address] [J96.91]     VISIT DIAGNOSIS:   Visit Diagnoses         Codes    Influenza A     J10.1 Metabolic encephalopathy     G93.41                PATIENT REPORT/COMPLAINT: denies difficulty swallowing    PRIOR LEVEL OF SWALLOW FUNCTION:    Past History of Dysphagia?:  none reported    Home diet: Diet information not available     Current Diet Order:  ADULT DIET; Regular; Low Fat/Low Chol/High Fiber/SHIV    PROCEDURE:  Consistencies Administered During the Evaluation   Liquids: thin liquid and nectar thick liquid   Solids:  pureed foods and solid foods      Method of Intake:   cup, straw, spoon  Self fed      Position:   Seated, upright, Lateral plane    INSTRUMENTAL ASSESSMENT:    ORAL PREP/ ORAL PHASE:    Decreased mastication due to:  poor/missing dentition       PHARYNGEAL PHASE:     ONSET TIME       Delayed initiation of the pharyngeal swallow was noted with swallow reflex triggered at the level of the pyriform sinus        PHARYNGEAL RESIDUALS        Vallecula/Pharyngeal Wall           No significant residuals were noted in the vallecula      Pyriform Sinuses      No significant residuals were noted in the pyriform sinuses     LARYNGEAL PENETRATION   Laryngeal penetration occurred prior to aspiration. Further details under aspiration section. ASPIRATION  Aspiration occurred BEFORE the swallow for thin liquid due to delayed pharyngeal swallow .  Aspiration was moderate-severe and occurred consistently -a delayed cough was noted    PENETRATION-ASPIRATION SCALE (PAS):  THIN 7 = Material enters the airway, passes below the vocal folds, and is not ejected from the trachea despite effort   MILDLY THICK 1 = Material does not enter the airway  MODERATELY THICK item not administered  PUREE 1 = Material does not enter the airway  HARD SOLID 1 = Material does not enter the airway       COMPENSATORY STRATEGIES    Compensatory strategies that were beneficial included Small bites/sips      STRUCTURAL/FUNCTIONAL ANOMALIES   No structural/functional anomalies were noted    CERVICAL ESOPHAGEAL STAGE :     The cervical esophagus appeared adequate          ___________    Cognition:   Follows 1 - step directions appropriate for this assessment    Oral Peripheral Examination   Generalized oral weakness    Current Respiratory Status   room air     Parameters of Speech Production  Respiration:  Adequate for speech production  Quality:   Within functional limits  Intensity: Within functional limits    Pain: No pain reported. EDUCATION:   The Speech Language Pathologist (SLP) completed education regarding results of evaluation and that intervention is warranted at this time. Learner: Patient  Education: Reviewed results and recommendations of this evaluation  Evaluation of Education:  Needs further instruction and Family not present    This plan may be re-evaluated and revised as warranted. Evaluation Time includes thorough review of current medical information, gathering information on past medical history/social history and prior level of function, completion of standardized testing/informal observation of tasks, assessment of data and education on plan of care and goals. INTERVENTION    Pt/family educated on above results and plan of care. Pt/family trained on compensatory strategies for safe swallow and signs and symptoms of aspiration (throat clearing, coughing/choking, wet vocal quality. , etc.) and encouraged to notify staff if observed. Handouts provided as warranted. Pt/family encouraged to engage in question/answer session. All questions answered and pt/family verbalized understanding of above. [x]The admitting diagnosis and active problem list, have been reviewed prior to initiation of this evaluation.     CPT Code: 50690  dysphagia study, 26924 dysphagia therapy    ACTIVE PROBLEM LIST:   Patient Active Problem List   Diagnosis    History of CVA (cerebrovascular accident)    History of hypertension    Overactive bladder    Expressive dysphasia    Hypertension    Polyneuropathy    Prostate cancer ([facility] [address] Proximal muscle weakness    Unsteady gait when walking    Weakness    Respiratory failure with hypoxia ([facility] [address] [facility]       Concepcion BRANNON CCC/SLP [id number]  Speech-Language Pathologist

## 2022-12-30 VITALS
WEIGHT: 193.8 LBS | HEART RATE: 79 BPM | OXYGEN SATURATION: 95 % | RESPIRATION RATE: 18 BRPM | HEIGHT: 71 IN | TEMPERATURE: 97.6 F | BODY MASS INDEX: 27.13 KG/M2 | SYSTOLIC BLOOD PRESSURE: 130 MMHG | DIASTOLIC BLOOD PRESSURE: 63 MMHG

## 2022-12-30 LAB
ANION GAP SERPL CALCULATED.3IONS-SCNC: 9 MMOL/L (ref 7–16)
BLOOD CULTURE, ROUTINE: NORMAL
BLOOD CULTURE, ROUTINE: NORMAL
BUN BLDV-MCNC: 18 MG/DL (ref 6–23)
CALCIUM SERPL-MCNC: 8.5 MG/DL (ref 8.6–10.2)
CHLORIDE BLD-SCNC: 102 MMOL/L (ref 98–107)
CO2: 27 MMOL/L (ref 22–29)
CREAT SERPL-MCNC: 0.7 MG/DL (ref 0.7–1.2)
GFR SERPL CREATININE-BSD FRML MDRD: >60 ML/MIN/1.73
GLUCOSE BLD-MCNC: 111 MG/DL (ref 74–99)
POTASSIUM SERPL-SCNC: 3.6 MMOL/L (ref 3.5–5)
SARS-COV-2, NAAT: NOT DETECTED
SODIUM BLD-SCNC: 138 MMOL/L (ref 132–146)

## 2022-12-30 PROCEDURE — 80048 BASIC METABOLIC PNL TOTAL CA: CPT

## 2022-12-30 PROCEDURE — 94640 AIRWAY INHALATION TREATMENT: CPT

## 2022-12-30 PROCEDURE — 2580000003 HC RX 258: Performed by: FAMILY MEDICINE

## 2022-12-30 PROCEDURE — 2700000000 HC OXYGEN THERAPY PER DAY

## 2022-12-30 PROCEDURE — 6370000000 HC RX 637 (ALT 250 FOR IP): Performed by: FAMILY MEDICINE

## 2022-12-30 PROCEDURE — 6360000002 HC RX W HCPCS: Performed by: FAMILY MEDICINE

## 2022-12-30 PROCEDURE — 94668 MNPJ CHEST WALL SBSQ: CPT

## 2022-12-30 PROCEDURE — 97165 OT EVAL LOW COMPLEX 30 MIN: CPT

## 2022-12-30 PROCEDURE — 87635 SARS-COV-2 COVID-19 AMP PRB: CPT

## 2022-12-30 PROCEDURE — 36415 COLL VENOUS BLD VENIPUNCTURE: CPT

## 2022-12-30 PROCEDURE — 6370000000 HC RX 637 (ALT 250 FOR IP): Performed by: INTERNAL MEDICINE

## 2022-12-30 PROCEDURE — 6370000000 HC RX 637 (ALT 250 FOR IP): Performed by: NURSE PRACTITIONER

## 2022-12-30 PROCEDURE — 2580000003 HC RX 258: Performed by: INTERNAL MEDICINE

## 2022-12-30 PROCEDURE — 6360000002 HC RX W HCPCS: Performed by: INTERNAL MEDICINE

## 2022-12-30 PROCEDURE — 2060000000 HC ICU INTERMEDIATE R&B

## 2022-12-30 PROCEDURE — 97161 PT EVAL LOW COMPLEX 20 MIN: CPT

## 2022-12-30 RX ORDER — AMOXICILLIN AND CLAVULANATE POTASSIUM 875; 125 MG/1; MG/1
1 TABLET, FILM COATED ORAL 2 TIMES DAILY
Qty: 8 TABLET | Refills: 0 | DISCHARGE
Start: 2022-12-30 | End: 2023-01-03

## 2022-12-30 RX ORDER — METOPROLOL SUCCINATE 25 MG/1
25 TABLET, EXTENDED RELEASE ORAL DAILY
Qty: 30 TABLET | Refills: 3 | DISCHARGE
Start: 2022-12-31

## 2022-12-30 RX ORDER — ATORVASTATIN CALCIUM 40 MG/1
40 TABLET, FILM COATED ORAL NIGHTLY
Qty: 30 TABLET | Refills: 3 | DISCHARGE
Start: 2022-12-30

## 2022-12-30 RX ADMIN — PYRIDOSTIGMINE BROMIDE 60 MG: 60 TABLET ORAL at 22:13

## 2022-12-30 RX ADMIN — PIPERACILLIN AND TAZOBACTAM 4500 MG: 4; .5 INJECTION, POWDER, FOR SOLUTION INTRAVENOUS at 09:35

## 2022-12-30 RX ADMIN — ACETYLCYSTEINE 600 MG: 100 SOLUTION ORAL; RESPIRATORY (INHALATION) at 09:44

## 2022-12-30 RX ADMIN — PIPERACILLIN AND TAZOBACTAM 4500 MG: 4; .5 INJECTION, POWDER, FOR SOLUTION INTRAVENOUS at 00:02

## 2022-12-30 RX ADMIN — ENOXAPARIN SODIUM 40 MG: 100 INJECTION SUBCUTANEOUS at 08:37

## 2022-12-30 RX ADMIN — DONEPEZIL HYDROCHLORIDE 10 MG: 10 TABLET, FILM COATED ORAL at 22:13

## 2022-12-30 RX ADMIN — IPRATROPIUM BROMIDE AND ALBUTEROL SULFATE 1 AMPULE: .5; 2.5 SOLUTION RESPIRATORY (INHALATION) at 09:44

## 2022-12-30 RX ADMIN — IPRATROPIUM BROMIDE AND ALBUTEROL SULFATE 1 AMPULE: .5; 2.5 SOLUTION RESPIRATORY (INHALATION) at 17:23

## 2022-12-30 RX ADMIN — IPRATROPIUM BROMIDE AND ALBUTEROL SULFATE 1 AMPULE: .5; 2.5 SOLUTION RESPIRATORY (INHALATION) at 13:19

## 2022-12-30 RX ADMIN — METOPROLOL SUCCINATE 25 MG: 25 TABLET, EXTENDED RELEASE ORAL at 08:38

## 2022-12-30 RX ADMIN — AMLODIPINE BESYLATE 5 MG: 5 TABLET ORAL at 08:38

## 2022-12-30 RX ADMIN — ATORVASTATIN CALCIUM 40 MG: 40 TABLET, FILM COATED ORAL at 22:13

## 2022-12-30 RX ADMIN — SODIUM CHLORIDE, PRESERVATIVE FREE 10 ML: 5 INJECTION INTRAVENOUS at 08:38

## 2022-12-30 RX ADMIN — SODIUM CHLORIDE 50 ML: 9 INJECTION, SOLUTION INTRAVENOUS at 00:01

## 2022-12-30 RX ADMIN — PYRIDOSTIGMINE BROMIDE 60 MG: 60 TABLET ORAL at 08:38

## 2022-12-30 RX ADMIN — PREDNISONE 10 MG: 5 TABLET ORAL at 08:38

## 2022-12-30 RX ADMIN — OSELTAMIVIR PHOSPHATE 75 MG: 75 CAPSULE ORAL at 08:38

## 2022-12-30 ASSESSMENT — PAIN SCALES - GENERAL: PAINLEVEL_OUTOF10: 0

## 2022-12-30 NOTE — PROGRESS NOTES
Physical Therapy    Initial Assessment     Name: Miki Wilhelm  : 1940  MRN: [id number]      Date of Service: 2022    Evaluating PT: Suyapa Stinson PT, DPT [id number]      Room #:  0119/8315-X  Diagnosis:  Metabolic encephalopathy [M02.85]  Influenza A [J10.1]  Acute respiratory failure with hypoxia ([address] [J96.01]  Respiratory failure with hypoxia ([address] [J96.91]  Pertinent Information:  Recent right hip surgery at [facility]  Precautions:  Fall risk, WBAT R LE (per patient), O2, external urinary catheter, TAPS, alarm    SUBJECTIVE:    Pt was admitted from [facility] and plan is to return at discharge. Pt lives with wife in a single story house. Pt ambulated with with [employer] with chair follow at rehab. OBJECTIVE:   Initial Evaluation  Date: 22 Treatment Date: Short Term/ Long Term   Goals   AM-PAC 6 Clicks 48/08     Was pt agreeable to Eval/treatment? Yes     Does pt have pain? No current complaints of pain     Bed Mobility  Rolling: NT  Supine to sit: Min A  Sit to supine: Min A  Scooting: Min A to EOB  Rolling: Independent   Supine to sit: Independent   Sit to supine: Independent   Scooting: Independent    Transfers Sit to stand: Min A  Stand to sit: Min A  Stand pivot: NT  Sit to stand: Supervision  Stand to sit: Supervision  Stand pivot: SBA with [employer]   Ambulation   Sidestepped 2 feet with [employer] with Min A  >50 feet with [employer] with SBA   Stair negotiation: ascended and descended NT  NA   ROM BUE: Refer to OT note  BLE: WFL     Strength BUE: Refer to OT note  BLE: WFL     Balance Sitting EOB: SBA  Dynamic Standing: Min A with [employer]  Sitting EOB: Independent   Dynamic Standing: Supervision with [employer]     Pt is A & O x: Not formally assessed. Pt is grossly oriented. Sensation: NT  Edema: NT    Patient education  Pt educated on PT role in acute care setting.     Patient response to education:   Pt verbalized understanding Pt demonstrated skill Pt requires further education in this area   Yes NA No     ASSESSMENT:    Conditions Requiring Skilled Therapeutic Intervention:    [x]Decreased strength     []Decreased ROM  [x]Decreased functional mobility  [x]Decreased balance   [x]Decreased endurance   []Decreased posture  []Decreased sensation  []Decreased coordination   []Decreased vision  [x]Decreased safety awareness   []Increased pain       Comments:    Pt was in bed upon room entry; agreeable to PT evaluation. Pt was able to follow simple commands. Pt moves slowly but only required light assist for all mobility. Sitting balance at EOB was Fair. Pt was able to stand and take sidesteps at EOB. Steps were small but fairly steady. Pt was assisted back to bed following all activity. Pt was left in bed with all needs met at conclusion of session. Treatment:  Patient practiced and was instructed in the following treatment:    Therapeutic activities:  Bed mobility: Pt was cued for technique during bed mobility transfers. Transfers: Pt was cued for hand placement during sit <> stand transfers. Vitals and symptoms were closely monitored throughout session. Skillful positioning in bed to protect skin/joint integrity. Pt's/family goals:  1. To return to rehab. Prognosis is Fair for reaching above PT goals. Patient and or family understand(s) diagnosis, prognosis, and plan of care. Yes. PHYSICAL THERAPY PLAN OF CARE:    PT POC is established based on physician order and patient diagnosis     Referring provider/PT Order:    Start   Ordered   12/25/22 1230  PT evaluation and treat  ONE TIME     Complete  Discontinue      12/25/22 1232     Diagnosis:  Metabolic encephalopathy [Y14.36]  Influenza A [J10.1]  Acute respiratory failure with hypoxia ([address] [J96.01]  Respiratory failure with hypoxia ([address] [J96.91]  Specific instructions for next treatment:  Progress activity.     Current Treatment Recommendations:     [x] Strengthening to improve independence with functional mobility   [] ROM to improve independence with functional mobility [x] Balance Training to improve static/dynamic balance and to reduce fall risk  [x] Endurance Training to improve activity tolerance during functional mobility   [x] Transfer Training to improve safety and independence with all functional transfers   [x] Gait Training to improve gait mechanics, endurance and assess need for appropriate assistive device  [] Stair Training in preparation for safe discharge home and/or into the community   [x] Positioning to prevent skin breakdown and contractures  [x] Safety and Education Training   [] Patient/Caregiver Education   [] HEP  [] Other     PT long term treatment goals are located in above grid    Frequency of treatments: 2-5x/week x 1-2 weeks. Time in  1215  Time out  1230    Total Treatment Time  0 minutes     Evaluation Time includes thorough review of current medical information, gathering information on past medical history/social history and prior level of function, completion of standardized testing/informal observation of tasks, assessment of data and education on plan of care and goals.     CPT codes:  [x] Low Complexity PT evaluation 69051  [] Moderate Complexity PT evaluation 10974  [] High Complexity PT evaluation 37111  [] PT Re-evaluation 40382  [] Gait training 42560 0 minutes  [] Manual therapy 48845 0 minutes  [] Therapeutic activities 99240 0 minutes  [] Therapeutic exercises 53596 0 minutes  [] Neuromuscular reeducation 92645 0 minutes     Deng Rosales PT, DPT  [id number]

## 2022-12-30 NOTE — PLAN OF CARE
Problem: Skin/Tissue Integrity  Goal: Absence of new skin breakdown  Description: 1. Monitor for areas of redness and/or skin breakdown  2. Assess vascular access sites hourly  3. Every 4-6 hours minimum:  Change oxygen saturation probe site  4. Every 4-6 hours:  If on nasal continuous positive airway pressure, respiratory therapy assess nares and determine need for appliance change or resting period.   12/29/2022 2320 by Gisselle Kenney RN  Outcome: Progressing  12/29/2022 1259 by Marline Cowart RN  Outcome: Progressing     Problem: Safety - Adult  Goal: Free from fall injury  12/29/2022 2320 by Gisselle Kenney RN  Outcome: Progressing  12/29/2022 1259 by Marline Cowart RN  Outcome: Progressing     Problem: Pain  Goal: Verbalizes/displays adequate comfort level or baseline comfort level  12/29/2022 2320 by Gisselle Kenney RN  Outcome: Progressing  12/29/2022 1259 by Marline Cowart RN  Outcome: Progressing     Problem: ABCDS Injury Assessment  Goal: Absence of physical injury  12/29/2022 2320 by Gisselle Kenney RN  Outcome: Progressing  12/29/2022 1259 by Marline Cowart RN  Outcome: Progressing     Problem: Chronic Conditions and Co-morbidities  Goal: Patient's chronic conditions and co-morbidity symptoms are monitored and maintained or improved  12/29/2022 2320 by Gisselle Kenney RN  Outcome: Progressing  [address] [place] (Taken 12/29/2022 2007)  Care Plan - Patient's Chronic Conditions and Co-Morbidity Symptoms are Monitored and Maintained or Improved: Monitor and assess patient's chronic conditions and comorbid symptoms for stability, deterioration, or improvement  12/29/2022 1259 by Marline Cowart RN  Outcome: Progressing

## 2022-12-30 NOTE — PROGRESS NOTES
Physician Progress Note      Fernanda Zelaya  CSN #:                  [id number]  :                       1940  ADMIT DATE:       2022 8:03 AM  DISCH DATE:  RESPONDING  PROVIDER #:        Radha Luz MD          QUERY TEXT:    Dear Attending Physician,    Pt admitted with hypoxic respiratory failure, Influenza A with confusion . Pt   noted to have elevated  WBC 12.4 --> 13.6--> 11.5, Lactic 1.1,  Vitals-   101.9--102.1, , 24-26, 120//93. If possible, please document in   the progress notes and discharge summary if you are evaluating and /or   treating any of the following: The medical record reflects the following:  Risk Factors: Influenza A , increasing confusion , resp failure, advanced age  Clinical Indicators: Per PCP ,\" Apparently patient recently had hip surgery   and he has been placed to [facility] for the past 5 weeks. Patient is normally   on 2 L O2 but saturating in the 80s as his baseline and he was requiring   additional oxygen needs. Patient has had a cough with fevers. .... Shavon Rubalcava Influenza   A ,hypoxic respiratory failure. ..extremely rhonchorous and short of breath . ..   appears dyspneic. ...confusion . Shavon Rubalcava \"  Per ED,\". .. Moderate respiratory   distress. ..saturating well on 6 L nasal cannula but had diminished breath   sounds and rhonchi in all lung fields. .. Patient is a  Treatment: o2, Tamiflu, Duonebs, Robitussin    Thank you,  Nikos Cruz RN CCDS  Clinical Documentation Improvement Specialist  Options provided:  -- Sepsis due to Influenza A, present on admission  -- Sepsis due to Influenza A Pneumonia, present on admission  -- Sepsis was ruled out  -- Other - I will add my own diagnosis  -- Disagree - Not applicable / Not valid  -- Disagree - Clinically unable to determine / Unknown  -- Refer to Clinical Documentation Reviewer    PROVIDER RESPONSE TEXT:    After further study, Sepsis was ruled out for this patient.     Query created by: Zay Alfaro on 12/27/2022 4:07 PM      QUERY TEXT:    Dear Attending Physician,    Pt admitted with hypoxic respiratory failure, Influenza A . Pt noted to have   confusion. If possible, please document in the progress notes and discharge   summary if you are evaluating and / or treating any of the following: The medical record reflects the following:  Risk Factors: Influenza A , increasing confusion , resp failure, advanced age  Clinical Indicators: Per PCP ,\" Apparently patient recently had hip surgery   and he has been placed to [facility] for the past 5 weeks. Patient is normally   on 2 L O2 but saturating in the 80s as his baseline and he was requiring   additional oxygen needs. Patient has had a cough with fevers. .... Anu Cabrera Influenza   A ,hypoxic respiratory failure. ..extremely rhonchorous and short of breath . ..   appears dyspneic. ...confusion . Anu Cabrera \"  Per ED,\". .. Moderate respiratory   distress. ..saturating well on 6 L nasal cannula but had diminished breath   sounds and rhonchi in all lung fields. .. Patient is a  Treatment: o2, Tamiflu, Duonebs, Robitussin    Thank you,  Bisi Hernandez RN CCDS  Clinical Documentation Improvement Specialist  Options provided:  -- Metabolic encephalopathy  -- Other - I will add my own diagnosis  -- Disagree - Not applicable / Not valid  -- Disagree - Clinically unable to determine / Unknown  -- Refer to Clinical Documentation Reviewer    PROVIDER RESPONSE TEXT:    This patient has metabolic encephalopathy. Query created by: Isaias Espinoza on 12/27/2022 4:11 PM      QUERY TEXT:    Dear Attending Physician,    Pt admitted with Influenza A with confusion and has hypoxic respiratory   failure documented per PCP-  Respirations 24-26. If possible, please document   in progress notes and discharge summary further specificity regarding the   acuity of respiratory failure:     The medical record reflects the following:  Risk Factors: Influenza A , increasing confusion , resp failure, advanced age  Clinical Indicators: Per PCP ,\" Apparently patient recently had hip surgery   and he has been placed to [facility] for the past 5 weeks. Patient is normally   on 2 L O2 but saturating in the 80s as his baseline and he was requiring   additional oxygen needs. Patient has had a cough with fevers. .... Anu Cabrera Influenza   A ,hypoxic respiratory failure. ..extremely rhonchorous and short of breath . ..   appears dyspneic. ...confusion . Anu Cabrera \"  Per ED,\". .. Moderate respiratory   distress. ..saturating well on 6 L nasal cannula but had diminished breath   sounds and rhonchi in all lung fields. .. Patient is a  Treatment: o2, Tamiflu, Duonebs, Robitussin    Thank you,  Bisi Hernandez RN CCDS  Clinical Documentation Improvement Specialist  Options provided:  -- Acute respiratory failure with hypoxia  -- Acute on chronic respiratory failure with hypoxia  -- Other - I will add my own diagnosis  -- Disagree - Not applicable / Not valid  -- Disagree - Clinically unable to determine / Unknown  -- Refer to Clinical Documentation Reviewer    PROVIDER RESPONSE TEXT:    This patient is in acute respiratory failure with hypoxia.     Query created by: Isaias Espinoza on 12/27/2022 4:17 PM      Electronically signed by:  Doug Elliott MD 12/30/2022 7:35 AM

## 2022-12-30 NOTE — PROGRESS NOTES
Occupational Therapy    OCCUPATIONAL THERAPY INITIAL EVALUATION    [facility] [place], [place]         SWLO:                                                  Patient Name: Kahlil Rushing    MRN: [id number]    : 1940    Room: [address] [place] [address]      Evaluating OT: Julio Roldan OTR/L   [id number]      Referring LINNETTE Enrique CNP    Specific Provider Orders/Date:OT eval and treat 2022      Diagnosis:  Metabolic encephalopathy [W95.21]  Influenza A [J10.1]  Acute respiratory failure with hypoxia ([address] [J96.01]  Respiratory failure with hypoxia ([address] [J96.91]   patient recently had hip surgery, and he has been at [facility] for approximately 5 weeks     Precautions:  Fall Risk, O2     Assessment of current deficits    [x] Functional mobility  [x]ADLs  [x] Strength               []Cognition    [x] Functional transfers   [x] IADLs         [x] Safety Awareness   [x]Endurance    [] Fine Coordination              [x] Balance      [] Vision/perception   []Sensation     []Gross Motor Coordination  [] ROM  [] Delirium                   [] Motor Control     OT PLAN OF CARE   OT POC based on physician orders, patient diagnosis and results of clinical assessment    Frequency/Duration  2-4 days/wk for 2 weeks PRN   Specific OT Treatment Interventions to include:   ADL retraining/adapted techniques and AE recommendations to increase functional independence within precautions                    Energy conservation techniques to improve tolerance for selfcare routine   Functional transfer/mobility training/DME recommendations for increased independence, safety and fall prevention         Patient/family education to increase safety and functional independence             Environmental modifications for safe mobility and completion of ADLs                             Therapeutic activity to improve functional performance during ADLs. Therapeutic exercise to improve tolerance and functional strength for ADLs    Balance retraining/tolerance tasks for facilitation of postural control with dynamic challenges during ADLs . Positioning to improve functional independence      Recommended Adaptive Equipment: TBD     SOCIAL: patient admitted from [place] [address] . Patient reports he was walking with walker in therapy   Prior:  Home Living: Pt lives with wife, 1 story        Pain Level: no pain ;   Cognition: A&O: id'ed month/year and place. Pleasant, conversing and following commands   Memory:  fair   Sequencing:  fair   Problem solving:  fair    Judgement/safety:  fair      Functional Assessment:  AM-PAC Daily Activity Raw Score: 16/24   Initial Eval Status  Date: 12/30/22 Treatment Status  Date: STGs = LTGs  Time frame: 10-14 days   Feeding Independent      Grooming Min A,seated   Supervision    UB Dressing Min A   Supervision    LB Dressing Max A   Min A    Bathing Mod A   Min A    Toileting Mod A  SBA    Bed Mobility  Min A  Supine <> sit   SBA    Functional Transfers Min A   Sit- stand from bed   SBA    Functional Mobility Min A w/walker   Side steps next to bed   SBA  with good tolerance    Balance Sitting:     Static:  supervision     Dynamic:Min A   Standing: Min A   Independent/supervision    Activity Tolerance No SOB   Good  with ADL activity    Visual/  Perceptual Glasses: none by bedside                 Hand Dominance right   AROM (PROM) Strength Additional Info:    RUE  WFL WFL good  and wfl FMC/dexterity noted during ADL tasks       LUE WFL WFL good  and wfl FMC/dexterity noted during ADL tasks       Hearing: WFL   Sensation:  No c/o numbness or tingling  Tone: WFL   Edema: none observed     Comments: Upon arrival patient lying in bed . At end of session, patient returned to bed  with call light and phone within reach, all lines and tubes intact.   *ALARM ON     Overall patient demonstrated  decreased independence and safety during completion of ADL/functional transfer/mobility tasks. Pt would benefit from continued skilled OT to increase safety and independence with completion of ADL/IADL tasks for functional independence and quality of life. Rehab Potential: good for established goals     Patient / Family Goal: none stated       Patient and/or family were instructed on functional diagnosis, prognosis/goals and OT plan of care. Demonstrated fair+ understanding. Eval Complexity: Low    Time In: 1210  Time Out: 1230      Min Units   OT Eval Low 97165 x  1   OT Eval Medium 00724      OT Eval High 12851      OT Re-Eval [id number]       Therapeutic Ex 83022      Therapeutic Activities 86327       ADL/Self Care 23971       Orthotic Management 65803       Manual 26179     Neuro Re-Ed 47808       Non-Billable Time         Evaluation Time additionally includes thorough review of current medical information, gathering information on past medical history/social history and prior level of function, interpretation of standardized testing/informal observation of tasks, assessment of data and development of plan of care and goals.             Cristela Mcburney  OTR/L  OT [id number]

## 2022-12-30 NOTE — DISCHARGE SUMMARY
[facility]   Discharge summary   Patient ID:  Laura Chamorro  [id number]  78 y.o.  1940    Admit date: 12/25/2022    Discharge date and time: 12/30/2022    Admission Diagnoses:   Patient Active Problem List   Diagnosis    History of CVA (cerebrovascular accident)    History of hypertension    Overactive bladder    Expressive dysphasia    Hypertension    Polyneuropathy    Prostate cancer ([facility]    Proximal muscle weakness    Unsteady gait when walking    Weakness    Respiratory failure with hypoxia ([facility]       Discharge Diagnoses: Respiratory failure with hypoxia, and debility, influenza A    Consults: none    Procedures: none    Hospital Course:     68-year-old male presents to the ED with complaints of shortness of breath and increased O2 needs and is admitted to telemetry unit with     Respiratory failure with hypoxia-likely related to influenza A  -Supportive care  -Supplement O2 to keep saturation greater than 93%. Patient currently requiring 4 L O2 as his baseline is 2 L.  -Tamiflu 75 mg twice daily for 5 days  -DuoNebs every 4 hours while awake, add Mucomyst 600 inhaled to twice daily and chest PT  -Discontinue IV fluids, 20 IV Lasix x1  -Robitussin for cough  -Droplet isolation  -Follow-up chest x-ray in a.m.      SVT/A. fib  -Manifestation of underlying current acute lung disease  -Continue to treat viral infection/pneumonia  -Unable to currently give Cardizem secondary to borderline blood pressure  -Continue to monitor once blood pressure is improved, initiate Cardizem drip if needed  -If persistent tachycardia, will give digoxin for better control     12/27/2022:  -Follow-up chest x-ray pending-increased bibasilar atelectasis versus consolidation  -Patient currently still requiring 4L oxygen, satting 93%, baseline 2L NC   -TMAX 102.1 °F on admission, currently afebrile at 98 °F> Monitor for fevers   -Initiate empiric IV antibiotic therapy with Zosyn-White count has normalized today  -Continue tamiflu and breathing treatments   -Heart rate significantly improved today with initiation of Toprol-XL twice daily yesterday  -Continue nebs and respiratory hygiene initiated yesterday     12/28/2022  Status markedly improved today  Continue Zosyn to complete at least a 3-day course  Obtain swallow evaluation as he may be aspirating  Lung sounds improved  Heart rate controlled  Case discussed with son at bedside  DC plan next 48 hours     12/29/22:  -Currently down to 3 L nasal cannula satting 93%, almost at baseline  -Continue IV Zosyn  -Await [facility] for diet recs   -d/c plan in the next 24 hours     12/30/22:  -Switch IV Zosyn to p.o. Augmentin x4 days to complete a full 7-day course  -Discharge to to SNF for rehab  -Continue modified diet recommendations  -Patient marked improved during inpatient stay  -Continue to watch respiratory status carefully  -Recommend ongoing symptoms spirometry    Recent Labs     12/28/22 0247 12/29/22  0418   WBC 11.4 11.1   HGB 14.2 13.7   HCT 44.0 43.5    269       Recent Labs     12/28/22  0247 12/29/22  0418 12/30/22  0433    140 138   K 3.5 4.2 3.6    103 102   CO2 30* 29 27   BUN 22 21 18   CREATININE 0.9 1.0 0.7   CALCIUM 8.5* 8.6 8.5*       CT Head W/O Contrast    Result Date: 12/25/2022  EXAMINATION: CT OF THE HEAD WITHOUT CONTRAST  12/25/2022 9:34 am TECHNIQUE: CT of the head was performed without the administration of intravenous contrast. Automated exposure control, iterative reconstruction, and/or weight based adjustment of the mA/kV was utilized to reduce the radiation dose to as low as reasonably achievable. COMPARISON: 08/14/2021 HISTORY: ORDERING SYSTEM PROVIDED HISTORY: altered mental status TECHNOLOGIST PROVIDED HISTORY: Reason for exam:->altered mental status Has a \"code stroke\" or \"stroke alert\" been called? ->No Decision Support Exception - unselect if not a suspected or confirmed emergency medical condition->Emergency Medical Condition (MA) FINDINGS: BRAIN/VENTRICLES: There is no acute intracranial hemorrhage, mass effect or midline shift. No abnormal extra-axial fluid collection. The gray-white differentiation is maintained without evidence of an acute infarct. There is prominence of the ventricles and sulci due to global parenchymal volume loss. There are nonspecific areas of hypoattenuation within the periventricular and subcortical white matter, which likely represent chronic microvascular ischemic change. ORBITS: The visualized portion of the orbits demonstrate no acute abnormality. SINUSES: The visualized paranasal sinuses and mastoid air cells demonstrate no acute abnormality. SOFT TISSUES/SKULL: No acute abnormality of the visualized skull or soft tissues. No acute intracranial abnormality. There are persistent probable bilateral thalamic nuclei lacunar infarcts with periventricular chronic white matter changes. XR CHEST PORTABLE    Result Date: 12/25/2022  EXAMINATION: ONE XRAY VIEW OF THE CHEST 12/25/2022 8:39 am COMPARISON: 14 August 2021 HISTORY: ORDERING SYSTEM PROVIDED HISTORY: cough, hypoxia TECHNOLOGIST PROVIDED HISTORY: Reason for exam:->cough, hypoxia FINDINGS: Single AP upright portable chest demonstrates mild increased markings with small pleural effusions. There are atherosclerotic changes of the aorta with mild cardiomegaly. There is no evidence of a pneumothorax. Mild cardiomegaly with mild CHF. Viral pneumonia is also a consideration. Discharge Exam:    HEENT: NCAT,  PERRLA, No JVD  Heart:  RRR, no murmurs, gallops, or rubs.   Lungs:  CTA bilaterally, no wheeze, rales or rhonchi  Abd: bowel sounds present, nontender, nondistended, no masses  Extrem:  No clubbing, cyanosis, or edema    Disposition: SNF     Patient Condition at Discharge: Stable     Patient Instructions:      Medication List        START taking these medications      amoxicillin-clavulanate 875-125 MG per tablet  Commonly known as: AUGMENTIN  Take 1 tablet by mouth 2 times daily for 4 days     metoprolol succinate 25 MG extended release tablet  Commonly known as: TOPROL XL  Take 1 tablet by mouth daily  Start taking on: December 31, 2022            CHANGE how you take these medications      atorvastatin 40 MG tablet  Commonly known as: LIPITOR  Take 1 tablet by mouth nightly  What changed:   medication strength  See the new instructions. CONTINUE taking these medications      acetaminophen 325 MG tablet  Commonly known as: TYLENOL     amLODIPine 5 MG tablet  Commonly known as: NORVASC  take 1 tablet by mouth once daily     aspirin 81 MG EC tablet     cyanocobalamin 1000 MCG tablet     donepezil 10 MG tablet  Commonly known as: ARICEPT     famotidine 20 MG tablet  Commonly known as: PEPCID     magnesium oxide 400 MG tablet  Commonly known as: MAG-OX     predniSONE 10 MG tablet  Commonly known as: DELTASONE     pyridostigmine 60 MG tablet  Commonly known as: MESTINON     pyridoxine 100 MG tablet  Commonly known as: B-6     senna-docusate 8.6-50 MG per tablet  Commonly known as: PERICOLACE     therapeutic multivitamin-minerals tablet               Where to Get Your Medications        Information about where to get these medications is not yet available    Ask your nurse or doctor about these medications  amoxicillin-clavulanate 875-125 MG per tablet  atorvastatin 40 MG tablet  metoprolol succinate 25 MG extended release tablet       Activity: activity as tolerated  Diet: regular diet    Pt has been advised to: Follow-up with Simon Hackett DO in 1 week. Follow-up with consultants as recommended by them    Note that over 30 minutes was spent in preparing discharge papers, discussing discharge with patient, medication review, etc.    Signed:  Monalisa Phoenix, APRN - CNP  12/30/2022  1:54 PM     Above note edited to reflect my thoughts     I personally saw, examined and provided care for the patient.  Radiographs, labs and medication list were reviewed by me independently. The case was discussed in detail and plans for care were established. Review of KOURTNEY Lucio   , documentation was conducted and revisions were made as appropriate directly by me. I agree with the above documented exam, problem list, and plan of care.      Victorina Eng MD  5:21 PM  12/30/2022

## 2022-12-30 NOTE — PROGRESS NOTES
[facility] Quality Flow/Interdisciplinary Rounds Progress Note        Quality Flow Rounds held on December 29, 2022    Disciplines Attending:  Bedside Nurse, , , and Nursing Unit Leadership    Omar Bustamante was admitted on 12/25/2022  8:03 AM    Anticipated Discharge Date:  Expected Discharge Date: 12/29/22    Disposition:    Robin Score:  Robin Scale Score: 16    Readmission Risk              Risk of Unplanned Readmission:  16           Discussed patient goal for the day, patient clinical progression, and barriers to discharge.   The following Goal(s) of the Day/Commitment(s) have been identified:   continue iv abx, barium swallow      Dannielle Dorado RN  December 29, 2022

## 2022-12-30 NOTE — CARE COORDINATION
Social Work / Discharge Planning : Patient will be returning to [address] today at 5:30 [facility]. Patient spouse Jeramiee Door updated as well as Mery from [facility] and patient RN and unit charge RN. AWait COVID. SADIQ to follow.  Electronically signed by KEV Vail on 12/30/22 at 11:49 AM EST

## 2023-01-03 LAB
BASOPHILS ABSOLUTE: 0.09 E9/L (ref 0–0.2)
BASOPHILS RELATIVE PERCENT: 0.9 % (ref 0–2)
EOSINOPHILS ABSOLUTE: 0.37 E9/L (ref 0.05–0.5)
EOSINOPHILS RELATIVE PERCENT: 3.9 % (ref 0–6)
HCT VFR BLD CALC: 41.7 % (ref 37–54)
HEMOGLOBIN: 13.2 G/DL (ref 12.5–16.5)
IMMATURE GRANULOCYTES #: 0.12 E9/L
IMMATURE GRANULOCYTES %: 1.3 % (ref 0–5)
LYMPHOCYTES ABSOLUTE: 1.17 E9/L (ref 1.5–4)
LYMPHOCYTES RELATIVE PERCENT: 12.2 % (ref 20–42)
MCH RBC QN AUTO: 29.6 PG (ref 26–35)
MCHC RBC AUTO-ENTMCNC: 31.7 % (ref 32–34.5)
MCV RBC AUTO: 93.5 FL (ref 80–99.9)
MONOCYTES ABSOLUTE: 0.71 E9/L (ref 0.1–0.95)
MONOCYTES RELATIVE PERCENT: 7.4 % (ref 2–12)
NEUTROPHILS ABSOLUTE: 7.12 E9/L (ref 1.8–7.3)
NEUTROPHILS RELATIVE PERCENT: 74.3 % (ref 43–80)
PDW BLD-RTO: 13.5 FL (ref 11.5–15)
PLATELET # BLD: 319 E9/L (ref 130–450)
PMV BLD AUTO: 10.6 FL (ref 7–12)
RBC # BLD: 4.46 E12/L (ref 3.8–5.8)
WBC # BLD: 9.6 E9/L (ref 4.5–11.5)

## 2023-01-13 NOTE — PROGRESS NOTES
Physician Progress Note      Scott Raymond  [facility] #:                  [id number]  :                       1940  ADMIT DATE:       2022 8:03 AM  DISCH DATE:        2022 10:45 PM  RESPONDING  PROVIDER #:        Daniel Cotto MD          QUERY TEXT:    Dear Attending Physician,    Pt admitted with Influenza, Ac resp failure . Pt noted to have Pneumonia. If   possible, please document in the progress notes and discharge summary if you   are evaluating and/or treating any of the following:    Note: CAP and HCAP indicate where the pneumonia was acquired, not a specific   type. The medical record reflects the following:  Risk Factors: Resp failure, Influenza A , Pneumonia ,advanced age  Clinical Indicators: Per PCP ,\". .. Respiratory failure with   hypoxia-likely related to influenza A.. viral infection/pneumonia. .-Switch IV   Zosyn to p.o. Augmentin x4 days to complete a full 7-day course. [facility]  Treatment: IV ATB    Thank you,  Clarisa Quinn RN CCDS  Clinical Documentation Improvement Specialist  Options provided:  -- Gram negative pneumonia  -- Gram positive pneumonia  -- MRSA pneumonia  -- MSSA pneumonia  -- Viral pneumonia  -- Other - I will add my own diagnosis  -- Disagree - Not applicable / Not valid  -- Disagree - Clinically unable to determine / Unknown  -- Refer to Clinical Documentation Reviewer    PROVIDER RESPONSE TEXT:    This patient has viral pneumonia.     Query created by: Hemalatha Melton on 2023 7:02 PM      Electronically signed by:  Daniel Cotto MD 2023 7:35 PM

## 2023-02-08 ENCOUNTER — TELEPHONE (OUTPATIENT)
Dept: FAMILY MEDICINE CLINIC | Age: 83
End: 2023-02-08

## 2023-02-08 NOTE — TELEPHONE ENCOUNTER
----- Message from [place], [place] sent at 2/8/2023 10:54 AM EST -----  Subject: Message to Provider    QUESTIONS  Information for Provider? Bethany from [facility] wants to see if    can follow for Home care orders, best phone [phone]  ---------------------------------------------------------------------------  --------------  [address]  [phone]; OK to leave message on voicemail  ---------------------------------------------------------------------------  --------------  SCRIPT ANSWERS  Relationship to Patient? Third Party  Third Party Type? [address]? Representative Name?  Bethany

## 2023-02-08 NOTE — TELEPHONE ENCOUNTER
He now has Dr. Shaan Valentin listed as his PCP   Is this accurate?  I would like to see him in the office  If he still plans to follow with me please change PCP and I will follow

## 2023-02-09 NOTE — TELEPHONE ENCOUNTER
Tripp Saxena called from [facility], patient is under Dr Victorina Narayanan while at [facility]. He will be discharging next week and returning to [facility].  Hospital follow up appt scheduled for 2/20/23

## 2023-02-09 NOTE — TELEPHONE ENCOUNTER
Bethany informed. She will check w/ the family and call back if Jose Francisco Allen wants to follow w/ Dr Julia Chavis.

## 2023-03-01 ENCOUNTER — OFFICE VISIT (OUTPATIENT)
Dept: FAMILY MEDICINE CLINIC | Age: 83
End: 2023-03-01
Payer: MEDICARE

## 2023-03-01 VITALS
HEART RATE: 62 BPM | SYSTOLIC BLOOD PRESSURE: 110 MMHG | OXYGEN SATURATION: 98 % | TEMPERATURE: 97.8 F | DIASTOLIC BLOOD PRESSURE: 60 MMHG | RESPIRATION RATE: 15 BRPM

## 2023-03-01 VITALS — WEIGHT: 193.78 LBS | BODY MASS INDEX: 27.13 KG/M2 | HEIGHT: 71 IN

## 2023-03-01 DIAGNOSIS — J18.9 PNEUMONIA OF BOTH LOWER LOBES DUE TO INFECTIOUS ORGANISM: ICD-10-CM

## 2023-03-01 DIAGNOSIS — R05.8 PRODUCTIVE COUGH: ICD-10-CM

## 2023-03-01 DIAGNOSIS — J18.9 PNEUMONIA OF BOTH LOWER LOBES DUE TO INFECTIOUS ORGANISM: Primary | ICD-10-CM

## 2023-03-01 PROCEDURE — 1036F TOBACCO NON-USER: CPT | Performed by: NURSE PRACTITIONER

## 2023-03-01 PROCEDURE — 1123F ACP DISCUSS/DSCN MKR DOCD: CPT | Performed by: NURSE PRACTITIONER

## 2023-03-01 PROCEDURE — 3078F DIAST BP <80 MM HG: CPT | Performed by: NURSE PRACTITIONER

## 2023-03-01 PROCEDURE — G8484 FLU IMMUNIZE NO ADMIN: HCPCS | Performed by: NURSE PRACTITIONER

## 2023-03-01 PROCEDURE — G8427 DOCREV CUR MEDS BY ELIG CLIN: HCPCS | Performed by: NURSE PRACTITIONER

## 2023-03-01 PROCEDURE — 99213 OFFICE O/P EST LOW 20 MIN: CPT | Performed by: NURSE PRACTITIONER

## 2023-03-01 PROCEDURE — 3074F SYST BP LT 130 MM HG: CPT | Performed by: NURSE PRACTITIONER

## 2023-03-01 PROCEDURE — G8417 CALC BMI ABV UP PARAM F/U: HCPCS | Performed by: NURSE PRACTITIONER

## 2023-03-01 RX ORDER — GUAIFENESIN 600 MG/1
600 TABLET, EXTENDED RELEASE ORAL 2 TIMES DAILY
Qty: 30 TABLET | Refills: 0 | Status: SHIPPED | OUTPATIENT
Start: 2023-03-01 | End: 2023-03-16

## 2023-03-01 RX ORDER — LEVOFLOXACIN 750 MG/1
TABLET ORAL
Qty: 1 TABLET | Refills: 10 | OUTPATIENT
Start: 2023-03-01

## 2023-03-01 RX ORDER — LOPERAMIDE HYDROCHLORIDE 2 MG/1
2 CAPSULE ORAL 4 TIMES DAILY PRN
COMMUNITY
Start: 2023-02-23

## 2023-03-01 RX ORDER — CLOTRIMAZOLE AND BETAMETHASONE DIPROPIONATE 10; .64 MG/G; MG/G
1 CREAM TOPICAL 2 TIMES DAILY
COMMUNITY
Start: 2023-02-16 | End: 2023-02-24

## 2023-03-01 RX ORDER — GUAIFENESIN 600 MG/1
TABLET, EXTENDED RELEASE ORAL
Qty: 1 TABLET | Refills: 10 | OUTPATIENT
Start: 2023-03-01

## 2023-03-01 RX ORDER — LEVOFLOXACIN 750 MG/1
750 TABLET ORAL DAILY
Qty: 5 TABLET | Refills: 0 | Status: SHIPPED | OUTPATIENT
Start: 2023-03-01 | End: 2023-03-06

## 2023-03-01 NOTE — PROGRESS NOTES
Chief Complaint   Cough and Congestion      HPI   Source of history provided by: patient. Concepcion Isabel is a 78 y.o. old male who presents to walk-in care for evaluation of productive cough X few days. Associated symptoms include cough, chest congestion, and shortness of breath Since onset symptoms have been worsening. The patient is full vaccinated for COVID. Has taken nothing at home with some symptomatic relief. Denies fever, chills, headache, sore throat, nasal congestion, rhinorrhea, nausea, vomiting, lethargy, body aches, otalgia, malaise, and sinus pressure. Pertinent PMH of: PMHpositive: pneumonia. Denies any PMH of URIhistory: COPD, asthma, and recurrent bronchitis. The patient has no history of tobacco abuse. ROS   Pertinent positives and negatives are stated within HPI, all other systems reviewed and are negative. Past Medical History:  has no past medical history on file. Surgical History:  has no past surgical history on file. Social History:  reports that he quit smoking about 54 years ago. His smoking use included cigarettes. He started smoking about 64 years ago. He has a 10.00 pack-year smoking history. He has never used smokeless tobacco.  Family History: family history is not on file. Allergies: Patient has no known allergies. Physical Exam      VS:  /60   Pulse 62   Temp 97.8 °F (36.6 °C) (Temporal)   Resp 15   SpO2 98%    Oxygen Saturation Interpretation: Normal.    Physical Exam  Vitals and nursing note reviewed. Constitutional:       Appearance: Normal appearance. He is normal weight. HENT:      Head: Normocephalic and atraumatic. Right Ear: Tympanic membrane, ear canal and external ear normal.      Left Ear: Tympanic membrane, ear canal and external ear normal.      Nose: Nose normal.      Mouth/Throat:      Mouth: Mucous membranes are moist.      Pharynx: Oropharynx is clear. Eyes:      Extraocular Movements: Extraocular movements intact. Conjunctiva/sclera: Conjunctivae normal.      Pupils: Pupils are equal, round, and reactive to light. Neck:      Thyroid: No thyroid mass, thyromegaly or thyroid tenderness. Trachea: Trachea normal.   Cardiovascular:      Rate and Rhythm: Normal rate and regular rhythm. Pulses: Normal pulses. Heart sounds: Normal heart sounds. Pulmonary:      Effort: Pulmonary effort is normal.      Breath sounds: Examination of the right-lower field reveals rhonchi and rales. Examination of the left-lower field reveals rhonchi and rales. Wheezing, rhonchi and rales present. Abdominal:      General: Bowel sounds are normal.      Palpations: Abdomen is soft. Musculoskeletal:         General: Normal range of motion. Cervical back: Normal range of motion and neck supple. Lymphadenopathy:      Cervical: No cervical adenopathy. Skin:     General: Skin is warm and dry. Capillary Refill: Capillary refill takes less than 2 seconds. Neurological:      General: No focal deficit present. Mental Status: He is alert and oriented to person, place, and time. Sensory: Sensation is intact. Motor: Motor function is intact. Coordination: Coordination is intact. Gait: Gait is intact. Deep Tendon Reflexes: Reflexes normal.   Psychiatric:         Attention and Perception: Attention and perception normal.         Mood and Affect: Mood normal.         Speech: Speech normal.         Behavior: Behavior normal.         Thought Content: Thought content normal.         Cognition and Memory: Cognition and memory normal.         Judgment: Judgment normal.         Lab / Imaging Results   (All laboratory and radiology results have been personally reviewed by myself)  Labs:  No results found for this visit on 03/01/23. Imaging: All Radiology results interpreted by Radiologist unless otherwise noted. No results found. Assessment/Plan  Sunny Vincent was seen today for cough and congestion.     Diagnoses and all orders for this visit:    Pneumonia of both lower lobes due to infectious organism  -     levoFLOXacin (LEVAQUIN) 750 MG tablet; Take 1 tablet by mouth daily for 5 days  -     guaiFENesin (MUCINEX) 600 MG extended release tablet; Take 1 tablet by mouth 2 times daily for 15 days    Productive cough  -     XR CHEST STANDARD (2 VW); Future    Chest x-ray reveals early bilateral infiltrates to the lower lungs. Patient recently had pneumonia in the beginning of this year. We will start the patient on Levaquin and Mucinex, side effects and administration instructions were discussed. Encourage pulmonary toileting. Increase fluids and rest.   Other symptomatic relief discussed including Tylenol prn pain/fever. Schedule f/u with PCP in 7-10 days if symptoms persist.  Go to ED sooner if symptoms worsen or change. ED immediately with high or refractory fever, progressive SOB, dyspnea, CP, calf pain/swelling, shaking chills, vomiting, abdominal pain, lethargy, flank pain, or decreased urinary output. Pt verbalizes understanding and is in agreement with plan of care. All questions answered. LINNETTE Suarez NP    *NOTE: This report was transcribed using voice recognition software. Every effort was made to ensure accuracy; however, inadvertent computerized transcription errors may be present. .

## 2023-03-07 ENCOUNTER — OFFICE VISIT (OUTPATIENT)
Dept: FAMILY MEDICINE CLINIC | Age: 83
End: 2023-03-07
Payer: MEDICARE

## 2023-03-07 VITALS
TEMPERATURE: 97.1 F | HEIGHT: 71 IN | SYSTOLIC BLOOD PRESSURE: 134 MMHG | BODY MASS INDEX: 27.3 KG/M2 | DIASTOLIC BLOOD PRESSURE: 66 MMHG | HEART RATE: 62 BPM | OXYGEN SATURATION: 98 % | RESPIRATION RATE: 15 BRPM | WEIGHT: 195 LBS

## 2023-03-07 DIAGNOSIS — G70.00 MG (MYASTHENIA GRAVIS) (HCC): ICD-10-CM

## 2023-03-07 DIAGNOSIS — C61 PROSTATE CANCER (HCC): ICD-10-CM

## 2023-03-07 DIAGNOSIS — T17.800D ASPIRATION INTO LOWER RESPIRATORY TRACT, SUBSEQUENT ENCOUNTER: ICD-10-CM

## 2023-03-07 DIAGNOSIS — L30.9 DERMATITIS: ICD-10-CM

## 2023-03-07 DIAGNOSIS — J18.9 PNEUMONIA OF BOTH LOWER LOBES DUE TO INFECTIOUS ORGANISM: Primary | ICD-10-CM

## 2023-03-07 DIAGNOSIS — I48.92 ATRIAL FLUTTER, UNSPECIFIED TYPE (HCC): ICD-10-CM

## 2023-03-07 PROCEDURE — 99214 OFFICE O/P EST MOD 30 MIN: CPT | Performed by: FAMILY MEDICINE

## 2023-03-07 PROCEDURE — 3075F SYST BP GE 130 - 139MM HG: CPT | Performed by: FAMILY MEDICINE

## 2023-03-07 PROCEDURE — 1036F TOBACCO NON-USER: CPT | Performed by: FAMILY MEDICINE

## 2023-03-07 PROCEDURE — G8417 CALC BMI ABV UP PARAM F/U: HCPCS | Performed by: FAMILY MEDICINE

## 2023-03-07 PROCEDURE — G8427 DOCREV CUR MEDS BY ELIG CLIN: HCPCS | Performed by: FAMILY MEDICINE

## 2023-03-07 PROCEDURE — G8484 FLU IMMUNIZE NO ADMIN: HCPCS | Performed by: FAMILY MEDICINE

## 2023-03-07 PROCEDURE — 3078F DIAST BP <80 MM HG: CPT | Performed by: FAMILY MEDICINE

## 2023-03-07 PROCEDURE — 1123F ACP DISCUSS/DSCN MKR DOCD: CPT | Performed by: FAMILY MEDICINE

## 2023-03-07 RX ORDER — CLOTRIMAZOLE AND BETAMETHASONE DIPROPIONATE 10; .64 MG/G; MG/G
CREAM TOPICAL
COMMUNITY
Start: 2023-03-01 | End: 2023-03-07

## 2023-03-07 RX ORDER — LEVOFLOXACIN 500 MG/1
500 TABLET, FILM COATED ORAL DAILY
Qty: 5 TABLET | Refills: 0 | Status: SHIPPED | OUTPATIENT
Start: 2023-03-07 | End: 2023-03-12

## 2023-03-07 RX ORDER — NIACINAMIDE, ADENOSINE 1; .02 G/50ML; G/50ML
CREAM TOPICAL
COMMUNITY

## 2023-03-07 RX ORDER — CLOTRIMAZOLE AND BETAMETHASONE DIPROPIONATE 10; .64 MG/G; MG/G
CREAM TOPICAL
Qty: 45 G | Refills: 0 | Status: SHIPPED | OUTPATIENT
Start: 2023-03-07

## 2023-03-07 RX ORDER — NIACINAMIDE, ADENOSINE 1; .02 G/50ML; G/50ML
CREAM TOPICAL
Qty: 1020 G | Refills: 5 | Status: SHIPPED | OUTPATIENT
Start: 2023-03-07

## 2023-03-07 RX ORDER — LEVOFLOXACIN 750 MG/1
TABLET ORAL
Qty: 1 TABLET | Refills: 0 | OUTPATIENT
Start: 2023-03-07

## 2023-03-07 RX ORDER — CLOTRIMAZOLE AND BETAMETHASONE DIPROPIONATE 10; .64 MG/G; MG/G
CREAM TOPICAL
Qty: 1 G | Refills: 0 | OUTPATIENT
Start: 2023-03-07

## 2023-03-07 ASSESSMENT — PATIENT HEALTH QUESTIONNAIRE - PHQ9
SUM OF ALL RESPONSES TO PHQ QUESTIONS 1-9: 0
DEPRESSION UNABLE TO ASSESS: FUNCTIONAL CAPACITY MOTIVATION LIMITS ACCURACY
1. LITTLE INTEREST OR PLEASURE IN DOING THINGS: 0
SUM OF ALL RESPONSES TO PHQ QUESTIONS 1-9: 0
SUM OF ALL RESPONSES TO PHQ9 QUESTIONS 1 & 2: 0
SUM OF ALL RESPONSES TO PHQ QUESTIONS 1-9: 0
SUM OF ALL RESPONSES TO PHQ QUESTIONS 1-9: 0
2. FEELING DOWN, DEPRESSED OR HOPELESS: 0

## 2023-03-07 NOTE — PROGRESS NOTES
[address] [facility] presents to the office today for   Chief Complaint   Patient presents with    Follow-up     Here for express care follow up    Pneumonia  Seen on express care on 3/1/23  Completed antibiotic  Still coughing  Mentation better  Reports he is feeling better    [facility] at [facility]    Aspiration  Noted in 12/2022 hospitalization  [place] thick recommended    Rash on back and legs    Review of Systems     /66   Pulse 62   Temp 97.1 °F (36.2 °C) (Temporal)   Resp 15   Ht 5' 10.5\" (1.791 m)   Wt 195 lb (88.5 kg)   SpO2 98%   BMI 27.58 kg/m²   Physical Exam  Constitutional:       Appearance: Normal appearance. HENT:      Head: Normocephalic and atraumatic. Mouth/Throat:      Mouth: Mucous membranes are moist.   Eyes:      Extraocular Movements: Extraocular movements intact. Conjunctiva/sclera: Conjunctivae normal.   Cardiovascular:      Rate and Rhythm: Normal rate. Heart sounds: Normal heart sounds. Pulmonary:      Effort: Pulmonary effort is normal.      Breath sounds: Rhonchi present. No rales. Skin:     General: Skin is warm. Neurological:      Mental Status: He is alert and oriented to person, place, and time. Psychiatric:         Mood and Affect: Mood normal.         Behavior: Behavior normal.          Current Outpatient Medications:     STARCH-MALTO DEXTRIN (THICK-IT) POWD, Take by mouth, Disp: , Rfl:     Starch, Thickening, (THICK-IT #2) POWD, Use to thicken all liquids to nectar thick daily, Disp: 1020 g, Rfl: 5    levoFLOXacin (LEVAQUIN) 500 MG tablet, Take 1 tablet by mouth daily for 5 days, Disp: 5 tablet, Rfl: 0    clotrimazole-betamethasone (LOTRISONE) 1-0.05 % cream, Apply topically 2 times daily. , Disp: 45 g, Rfl: 0    loperamide (IMODIUM) 2 MG capsule, Take 2 mg by mouth 4 times daily as needed for Diarrhea, Disp: , Rfl:     magnesium hydroxide (MILK OF MAGNESIA) 400 MG/5ML suspension, Take by mouth daily as needed for Constipation, Disp: , Rfl:     atorvastatin (LIPITOR) 40 MG tablet, Take 1 tablet by mouth nightly, Disp: 30 tablet, Rfl: 3    metoprolol succinate (TOPROL XL) 25 MG extended release tablet, Take 1 tablet by mouth daily, Disp: 30 tablet, Rfl: 3    acetaminophen (TYLENOL) 325 MG tablet, Take 650 mg by mouth every 6 hours as needed for Pain, Disp: , Rfl:     aspirin 81 MG EC tablet, Take 81 mg by mouth daily, Disp: , Rfl:     famotidine (PEPCID) 20 MG tablet, Take 20 mg by mouth 2 times daily, Disp: , Rfl:     magnesium oxide (MAG-OX) 400 MG tablet, Take 400 mg by mouth daily, Disp: , Rfl:     Multiple Vitamins-Minerals (THERAPEUTIC MULTIVITAMIN-MINERALS) tablet, Take 1 tablet by mouth daily, Disp: , Rfl:     pyridoxine (B-6) 100 MG tablet, Take 100 mg by mouth daily, Disp: , Rfl:     senna-docusate (PERICOLACE) 8.6-50 MG per tablet, Take 1 tablet by mouth in the morning and at bedtime, Disp: , Rfl:     amLODIPine (NORVASC) 5 MG tablet, take 1 tablet by mouth once daily, Disp: 90 tablet, Rfl: 1    cyanocobalamin 1000 MCG tablet, Take 1,000 mcg by mouth daily, Disp: , Rfl:     donepezil (ARICEPT) 10 MG tablet, Take 10 mg by mouth nightly, Disp: , Rfl:     predniSONE (DELTASONE) 10 MG tablet, Take 10 mg by mouth daily, Disp: , Rfl:     pyridostigmine (MESTINON) 60 MG tablet, Take 60 mg by mouth in the morning and at bedtime, Disp: , Rfl:      No past medical history on file. Cristobal Boudreaux was seen today for follow-up. Diagnoses and all orders for this visit:    Pneumonia of both lower lobes due to infectious organism  -     levoFLOXacin (LEVAQUIN) 500 MG tablet;  Take 1 tablet by mouth daily for 5 days    MG (myasthenia gravis) (HCC)    Atrial flutter, unspecified type ([address]    Prostate cancer ([facility] [address]    Aspiration into lower respiratory tract, subsequent encounter  -     Starch, Thickening, (THICK-IT #2) POWD; Use to thicken all liquids to nectar thick daily    Dermatitis  -     clotrimazole-betamethasone (LOTRISONE) 1-0.05 % cream; Apply topically 2 times daily.      Extend levaquin  CXR in 5 weeks  As above    Javier Flores MD

## 2023-03-23 PROBLEM — Z96.659 HISTORY OF TOTAL KNEE REPLACEMENT: Status: ACTIVE | Noted: 2023-03-23

## 2023-03-23 PROBLEM — R42 DIZZINESS AND GIDDINESS: Status: ACTIVE | Noted: 2023-03-23

## 2023-03-23 PROBLEM — E66.9 OBESITY: Status: ACTIVE | Noted: 2023-03-23

## 2023-03-23 PROBLEM — S82.143A CLOSED FRACTURE OF TIBIAL PLATEAU: Status: ACTIVE | Noted: 2023-03-23

## 2023-03-23 PROBLEM — F03.90 DEMENTIA (HCC): Status: ACTIVE | Noted: 2023-03-23

## 2023-03-23 PROBLEM — K29.30 CHRONIC SUPERFICIAL GASTRITIS: Status: ACTIVE | Noted: 2023-03-23

## 2023-03-23 PROBLEM — K25.5 CHRONIC GASTRIC ULCER WITH PERFORATION (HCC): Status: ACTIVE | Noted: 2023-03-23

## 2023-03-23 PROBLEM — S72.009A CLOSED FRACTURE OF NECK OF FEMUR (HCC): Status: ACTIVE | Noted: 2023-03-23

## 2023-03-23 PROBLEM — M15.9 OSTEOARTHRITIS OF MULTIPLE JOINTS: Status: ACTIVE | Noted: 2023-03-23

## 2023-03-23 NOTE — PROGRESS NOTES
has had some sacral skin breakdown but now has air flow mattress. Skin is healed. REVIEW OF SYSTEMS:    GENERAL: Appetite good. No weight change, generally healthy, no change in strength or exercise tolerance. SKIN:  No rash, itching, lesions, ecchymosis, erythema or ulcers. HEAD: No headaches, no lightheadedness, no injury. EYES: Normal vision, no diplopia, no tearing, no blind spots, no pain. EARS: No change in hearing, no tinnitus, no bleeding, no vertigo. NOSE: No epistaxis, no coryza, no obstruction, no discharge. MOUTH: No dental difficulties, no gingival bleeding, PARTIAL dentures. NECK: No stiffness, no pain, no tenderness, no noted masses. CARDIOVASCULAR: No chest pains, no murmurs, no palpitations, no syncope, no orthopnea. LEG SWELLING. RESPIRATORY: No pain with breathing, no wheezing, no hemoptysis, no cough, no respiratory infections, no TB, no fevers or night sweats. FATIMA. GASTROINTESTINAL: No change in appetite, no dysphagia, no heartburn, no abdominal pains, no constipation or diarrhea, no bowel habit changes, no emesis, no melena, no hemorrhoids. GENITOURINARY: No urinary urgency, no nocturia, no frequent UTIs, no dysuria, no change in nature of urine. INCONTINENT AT TIMES. MUSCULOSKELETAL: No swelling or redness of joints, no limitation of range of motion, no weakness or numbness. PAIN IN JOINTS. NEURO/PSYCH: No weakness, no tremor, no seizures, no ataxia. No depressive symptoms, no changes in sleep habits, no changes in thought content. MEMORY LOSS,     ALLERGIC/IMMUNOLOGIC/ENDOCRINE:  No reactions to drugs, foods or  insects. No anemia, no bleeding tendency, no transfusions. No Known Allergies    No past medical history on file. Past Surgical History:   Procedure Laterality Date    CHOLECYSTECTOMY      EYE SURGERY      ORIF HIP FRACTURE Right 11/26/2022     Social History     Socioeconomic History    Marital status:

## 2023-03-24 ENCOUNTER — OFFICE VISIT (OUTPATIENT)
Dept: PRIMARY CARE CLINIC | Age: 83
End: 2023-03-24

## 2023-03-24 VITALS
TEMPERATURE: 98.2 F | HEART RATE: 64 BPM | DIASTOLIC BLOOD PRESSURE: 78 MMHG | BODY MASS INDEX: 27.45 KG/M2 | RESPIRATION RATE: 16 BRPM | HEIGHT: 71 IN | WEIGHT: 196.1 LBS | SYSTOLIC BLOOD PRESSURE: 133 MMHG | OXYGEN SATURATION: 96 %

## 2023-03-24 DIAGNOSIS — N32.81 OVERACTIVE BLADDER: ICD-10-CM

## 2023-03-24 DIAGNOSIS — F03.A0 MILD DEMENTIA WITHOUT BEHAVIORAL DISTURBANCE, PSYCHOTIC DISTURBANCE, MOOD DISTURBANCE, OR ANXIETY, UNSPECIFIED DEMENTIA TYPE (HCC): ICD-10-CM

## 2023-03-24 DIAGNOSIS — I10 PRIMARY HYPERTENSION: Primary | ICD-10-CM

## 2023-03-24 DIAGNOSIS — R53.1 WEAKNESS: ICD-10-CM

## 2023-03-24 DIAGNOSIS — C61 PROSTATE CANCER (HCC): ICD-10-CM

## 2023-03-24 DIAGNOSIS — G62.9 POLYNEUROPATHY: ICD-10-CM

## 2023-03-24 DIAGNOSIS — S72.001D CLOSED FRACTURE OF NECK OF RIGHT FEMUR WITH ROUTINE HEALING: ICD-10-CM

## 2023-03-24 DIAGNOSIS — K29.30 CHRONIC SUPERFICIAL GASTRITIS WITHOUT BLEEDING: ICD-10-CM

## 2023-03-24 DIAGNOSIS — G70.00 MYASTHENIA GRAVIS (HCC): ICD-10-CM

## 2023-03-24 DIAGNOSIS — R26.81 UNSTEADY GAIT WHEN WALKING: ICD-10-CM

## 2023-03-24 PROBLEM — S82.143A CLOSED FRACTURE OF TIBIAL PLATEAU: Status: RESOLVED | Noted: 2023-03-23 | Resolved: 2023-03-24

## 2023-03-24 PROBLEM — J96.91 RESPIRATORY FAILURE WITH HYPOXIA (HCC): Status: RESOLVED | Noted: 2022-12-25 | Resolved: 2023-03-24

## 2023-03-28 LAB
ALBUMIN: 3.8 GM/DL (ref 3.4–5)
ALP BLD-CCNC: 72 U/L (ref 46–116)
ALT SERPL-CCNC: 12 U/L (ref 10–49)
AST SERPL-CCNC: 24 IU/L (ref 0–34)
BILIRUB SERPL-MCNC: 0.7 MG/DL (ref 0.3–1.2)
BUN BLDV-MCNC: 12 MG/DL (ref 9–23)
CALCIUM SERPL-MCNC: 9.5 MD/DL (ref 8.7–10.4)
CHLORIDE BLD-SCNC: 104 MMOL/L (ref 98–107)
CHOLESTEROL: 150 MG/DL
CO2: 31 MMOL/L (ref 20–31)
CREAT SERPL-MCNC: 0.93 MG/DL (ref 0.7–1.3)
GFR AFRICAN AMERICAN: > 60 ML/MIN
GFR SERPL CREATININE-BSD FRML MDRD: >60 ML/MIN/
GLUCOSE: 99 MG/DL (ref 65–99)
HCT VFR BLD CALC: 50.1 % (ref 42–52)
HDLC SERPL-MCNC: 49 MG/DL (ref 40–60)
HEMOGLOBIN: 16.4 G/DL (ref 14–18)
LDL CHOLESTEROL: 77 MG/DL (ref 9–159)
MCH RBC QN AUTO: 29.6 PG (ref 27–31)
MCHC RBC AUTO-ENTMCNC: 32.7 G/DL (ref 33–37)
MCV RBC AUTO: 90.4 FL (ref 80–94)
NUCLEATED RED BLOOD CELLS: 0 % (ref 0–0)
PDW BLD-RTO: 14 % (ref 0–14.5)
PLATELET # BLD: 232 10*3/UL (ref 130–400)
PMV BLD AUTO: 10.3 FL (ref 9.6–12.3)
POTASSIUM SERPL-SCNC: 5 MMOL/L (ref 3.4–5.1)
RBC # BLD: 5.54 10*6/UL (ref 4.5–5.9)
SODIUM BLD-SCNC: 142 MMOL/L (ref 136–145)
TOTAL PROTEIN: 6.7 GM/DL (ref 6–8)
TRIGL SERPL-MCNC: 118 MG/DL
TSH SERPL DL<=0.05 MIU/L-ACNC: 3.31 UIU/ML (ref 0.55–4.78)
VLDLC SERPL CALC-MCNC: 24 MG/DL (ref 6–40)
WBC # BLD: 7.8 10*3/UL (ref 4.8–10.8)

## 2023-03-29 RX ORDER — PREDNISONE 10 MG/1
TABLET ORAL
Qty: 31 TABLET | Refills: 11 | Status: SHIPPED | OUTPATIENT
Start: 2023-03-29

## 2023-03-29 RX ORDER — METOPROLOL SUCCINATE 25 MG/1
TABLET, EXTENDED RELEASE ORAL
Qty: 31 TABLET | Refills: 11 | Status: SHIPPED | OUTPATIENT
Start: 2023-03-29

## 2023-03-29 RX ORDER — DONEPEZIL HYDROCHLORIDE 10 MG/1
TABLET, FILM COATED ORAL
Qty: 31 TABLET | Refills: 11 | Status: SHIPPED | OUTPATIENT
Start: 2023-03-29

## 2023-03-29 RX ORDER — AMLODIPINE BESYLATE 5 MG/1
TABLET ORAL
Qty: 31 TABLET | Refills: 11 | Status: SHIPPED | OUTPATIENT
Start: 2023-03-29

## 2023-03-29 RX ORDER — FAMOTIDINE 20 MG/1
TABLET, FILM COATED ORAL
Qty: 62 TABLET | Refills: 11 | Status: SHIPPED | OUTPATIENT
Start: 2023-03-29

## 2023-03-29 RX ORDER — PYRIDOSTIGMINE BROMIDE 60 MG/1
TABLET ORAL
Qty: 62 TABLET | Refills: 11 | Status: SHIPPED | OUTPATIENT
Start: 2023-03-29

## 2023-04-19 PROBLEM — S72.001D CLOSED FRACTURE OF NECK OF RIGHT FEMUR WITH ROUTINE HEALING: Status: RESOLVED | Noted: 2022-11-26 | Resolved: 2023-04-19

## 2023-04-19 NOTE — PROGRESS NOTES
4/20/2023    Home visit medically necessary in lieu of an office visit due to: [place] resident, uses walker/cane, dementia, difficult to get out. Seen with dtr Duana Mortimer. HPI:   Patient says he is doing good. He has not had any recent falls since then. He is not having any pain of R hip. He is doing well, ambulating with walker. He is supposed to be seen by Dr. Catherine Porter, neuro, in [place] for myasthenia gravis. Dtr is trying to set up new treatment available, Vyvgart infusions for MG. He has never had a gastric ulcer. He has mild memory issues but does not see any change with Aricept. He has neuropathy and gabapentin has helped. He had some sacral skin breakdown but now has air flow mattress. His skin is intact. REVIEW OF SYSTEMS:    GENERAL: Appetite good. No weight change, generally healthy, no change in strength or exercise tolerance. CARDIOVASCULAR: No chest pains, no murmurs, no palpitations, no syncope, no orthopnea. LEG SWELLING. RESPIRATORY: No pain with breathing, no wheezing, no hemoptysis, no cough. GASTROINTESTINAL: No change in appetite, no dysphagia, no heartburn, no abdominal pains, no constipation or diarrhea, no bowel habit changes, no emesis, no melena, no hemorrhoids. GENITOURINARY: No urinary urgency, no nocturia, no frequent UTIs, no dysuria, no change in nature of urine. INCONTINENT AT TIMES. MUSCULOSKELETAL: No swelling or redness of joints, no limitation of range of motion, no weakness or numbness. PAIN IN JOINTS. NEURO/PSYCH: No weakness, no tremor, no seizures, no ataxia. No depressive symptoms, no changes in sleep habits, no changes in thought content. MEMORY LOSS,     All other systems negative. No Known Allergies    No past medical history on file. Past Surgical History:   Procedure Laterality Date    CHOLECYSTECTOMY      EYE SURGERY      ORIF HIP FRACTURE Right 11/26/2022     Social History     Socioeconomic History    Marital status:      Number of children: 2

## 2023-04-20 ENCOUNTER — OFFICE VISIT (OUTPATIENT)
Dept: PRIMARY CARE CLINIC | Age: 83
End: 2023-04-20
Payer: MEDICARE

## 2023-04-20 VITALS
OXYGEN SATURATION: 97 % | WEIGHT: 193.8 LBS | TEMPERATURE: 96.7 F | HEIGHT: 71 IN | BODY MASS INDEX: 27.13 KG/M2 | SYSTOLIC BLOOD PRESSURE: 144 MMHG | DIASTOLIC BLOOD PRESSURE: 76 MMHG | RESPIRATION RATE: 18 BRPM | HEART RATE: 70 BPM

## 2023-04-20 DIAGNOSIS — I10 PRIMARY HYPERTENSION: Primary | ICD-10-CM

## 2023-04-20 DIAGNOSIS — Z86.73 HISTORY OF CVA (CEREBROVASCULAR ACCIDENT): ICD-10-CM

## 2023-04-20 DIAGNOSIS — F03.A0 MILD DEMENTIA WITHOUT BEHAVIORAL DISTURBANCE, PSYCHOTIC DISTURBANCE, MOOD DISTURBANCE, OR ANXIETY, UNSPECIFIED DEMENTIA TYPE (HCC): ICD-10-CM

## 2023-04-20 DIAGNOSIS — R26.81 UNSTEADY GAIT WHEN WALKING: ICD-10-CM

## 2023-04-20 DIAGNOSIS — R47.02 EXPRESSIVE DYSPHASIA: ICD-10-CM

## 2023-04-20 PROCEDURE — 99349 HOME/RES VST EST MOD MDM 40: CPT | Performed by: FAMILY MEDICINE

## 2023-04-20 PROCEDURE — G8417 CALC BMI ABV UP PARAM F/U: HCPCS | Performed by: FAMILY MEDICINE

## 2023-04-20 PROCEDURE — 1123F ACP DISCUSS/DSCN MKR DOCD: CPT | Performed by: FAMILY MEDICINE

## 2023-04-20 SDOH — ECONOMIC STABILITY: FOOD INSECURITY: WITHIN THE PAST 12 MONTHS, THE FOOD YOU BOUGHT JUST DIDN'T LAST AND YOU DIDN'T HAVE MONEY TO GET MORE.: NEVER TRUE

## 2023-04-20 SDOH — ECONOMIC STABILITY: INCOME INSECURITY: HOW HARD IS IT FOR YOU TO PAY FOR THE VERY BASICS LIKE FOOD, HOUSING, MEDICAL CARE, AND HEATING?: NOT HARD AT ALL

## 2023-04-20 SDOH — ECONOMIC STABILITY: FOOD INSECURITY: WITHIN THE PAST 12 MONTHS, YOU WORRIED THAT YOUR FOOD WOULD RUN OUT BEFORE YOU GOT MONEY TO BUY MORE.: NEVER TRUE

## 2023-04-20 SDOH — ECONOMIC STABILITY: HOUSING INSECURITY
IN THE LAST 12 MONTHS, WAS THERE A TIME WHEN YOU DID NOT HAVE A STEADY PLACE TO SLEEP OR SLEPT IN A SHELTER (INCLUDING NOW)?: NO

## 2023-05-17 NOTE — PROGRESS NOTES
Remembers 3 of 3 items. No delusional statements. Medications reviewed. Labs 3/28/23 HH 16/50, GFR>60, lytes nl, LFTs nl, , HDL 49, TSH 3.30   1/3/23 HH 13/43, GFR>60, lytes nl    ASSESSMENT:  Elderly male has History of CVA (cerebrovascular accident); Overactive bladder; Expressive dysphasia; Primary hypertension; Polyneuropathy; Prostate cancer ([address] Proximal muscle weakness; Unsteady gait when walking; Weakness; Myasthenia gravis ([address] Atrial flutter, unspecified type ([address] Primary osteoarthritis involving multiple joints; Obesity; History of total knee replacement; Dizziness and giddiness; Mild dementia without behavioral disturbance, psychotic disturbance, mood disturbance, or anxiety ([address] Chronic superficial gastritis; and History of falling, presenting hazards to health on their problem list.     Diagnoses attached to this encounter:  Sheryl Banuelos was seen today for hypertension, dementia and joint pain. Diagnoses and all orders for this visit:    Primary hypertension    Mild dementia without behavioral disturbance, psychotic disturbance, mood disturbance, or anxiety, unspecified dementia type (HCC)    Myasthenia gravis (HCC)    Polyneuropathy    Primary osteoarthritis involving multiple joints    Unsteady gait when walking    History of falling, presenting hazards to health      PLAN:  Continue PT for gait and fall prevention. F/U appt with Dr. Cruz Clifford, neuro, for management of MG TBA. Continue other meds as per Rx List. Recheck 1 month. 40 minutes spent on visit, 25 minutes involved education/counseling regarding HTN, unsteady gait, dementia, GI disease processes, treatment options, meds and coordination of care.    Current Outpatient Medications   Medication Sig Dispense Refill    pyridostigmine (MESTINON) 60 MG tablet TAKE 1 TABLET BY MOUTH TWICE DAILY 62 tablet 11    predniSONE (DELTASONE) 10 MG tablet TAKE ONE(1) TABLET BY MOUTH ONCE DAILY 31 tablet 11    famotidine (PEPCID) 20 MG tablet TAKE 1

## 2023-05-18 ENCOUNTER — OFFICE VISIT (OUTPATIENT)
Dept: PRIMARY CARE CLINIC | Age: 83
End: 2023-05-18
Payer: MEDICARE

## 2023-05-18 VITALS
WEIGHT: 196.3 LBS | TEMPERATURE: 98.8 F | HEIGHT: 71 IN | DIASTOLIC BLOOD PRESSURE: 74 MMHG | OXYGEN SATURATION: 96 % | HEART RATE: 74 BPM | BODY MASS INDEX: 27.48 KG/M2 | SYSTOLIC BLOOD PRESSURE: 130 MMHG | RESPIRATION RATE: 18 BRPM

## 2023-05-18 DIAGNOSIS — M15.9 PRIMARY OSTEOARTHRITIS INVOLVING MULTIPLE JOINTS: ICD-10-CM

## 2023-05-18 DIAGNOSIS — R26.81 UNSTEADY GAIT WHEN WALKING: ICD-10-CM

## 2023-05-18 DIAGNOSIS — Z91.81 HISTORY OF FALLING, PRESENTING HAZARDS TO HEALTH: ICD-10-CM

## 2023-05-18 DIAGNOSIS — G70.00 MYASTHENIA GRAVIS (HCC): ICD-10-CM

## 2023-05-18 DIAGNOSIS — F03.A0 MILD DEMENTIA WITHOUT BEHAVIORAL DISTURBANCE, PSYCHOTIC DISTURBANCE, MOOD DISTURBANCE, OR ANXIETY, UNSPECIFIED DEMENTIA TYPE (HCC): ICD-10-CM

## 2023-05-18 DIAGNOSIS — I10 PRIMARY HYPERTENSION: Primary | ICD-10-CM

## 2023-05-18 DIAGNOSIS — G62.9 POLYNEUROPATHY: ICD-10-CM

## 2023-05-18 PROCEDURE — 3078F DIAST BP <80 MM HG: CPT | Performed by: FAMILY MEDICINE

## 2023-05-18 PROCEDURE — 1036F TOBACCO NON-USER: CPT | Performed by: FAMILY MEDICINE

## 2023-05-18 PROCEDURE — 99349 HOME/RES VST EST MOD MDM 40: CPT | Performed by: FAMILY MEDICINE

## 2023-05-18 PROCEDURE — 1123F ACP DISCUSS/DSCN MKR DOCD: CPT | Performed by: FAMILY MEDICINE

## 2023-05-18 PROCEDURE — G8417 CALC BMI ABV UP PARAM F/U: HCPCS | Performed by: FAMILY MEDICINE

## 2023-05-18 PROCEDURE — 3075F SYST BP GE 130 - 139MM HG: CPT | Performed by: FAMILY MEDICINE

## 2023-07-12 NOTE — PROGRESS NOTES
7/13/2023    Home visit medically necessary in lieu of an office visit due to: [place] resident, uses walker/cane, dementia, difficult to get out. HPI:   Patient says he is doing okay. He is not having any pain. He continues receiving PT and doing HEP. He is ambulating with walker and walks down to dining room for meals. He saw Dr. Valarie Brown, neuro, in [place] for myasthenia gravis. He is going to [facility] neuro on 7/25/23. Dtr hopes to set up new treatment with Vyvgart infusions for MG. He feels better taking fewer pills. He has not had any stomach acid or burning. Gabapentin has helped his neuropathy. He has no skin problems. REVIEW OF SYSTEMS:    GENERAL: Appetite good. No weight change, generally healthy, no change in strength or exercise tolerance. CARDIOVASCULAR: No chest pains, no murmurs, no palpitations, no syncope, no orthopnea. LEG SWELLING. RESPIRATORY: No pain with breathing, no wheezing, no hemoptysis, no cough. GASTROINTESTINAL: No change in appetite, no dysphagia, no heartburn, no abdominal pains, no constipation or diarrhea, no bowel habit changes, no emesis, no melena, no hemorrhoids. GENITOURINARY: No urinary urgency, no nocturia, no frequent UTIs, no dysuria, no change in nature of urine. INCONTINENT AT TIMES. MUSCULOSKELETAL: No swelling or redness of joints, no limitation of range of motion, no weakness or numbness. PAIN IN JOINTS. NEURO/PSYCH: No weakness, no tremor, no seizures, no ataxia. No depressive symptoms, no changes in sleep habits, no changes in thought content. MEMORY LOSS,     All other systems negative. No Known Allergies    No past medical history on file. Past Surgical History:   Procedure Laterality Date    CHOLECYSTECTOMY      EYE SURGERY      ORIF HIP FRACTURE Right 11/26/2022     Social History     Socioeconomic History    Marital status:      Number of children: 2 daughters, 1 son    Highest education level: H.S.   Occupational History

## 2023-07-13 ENCOUNTER — OFFICE VISIT (OUTPATIENT)
Dept: PRIMARY CARE CLINIC | Age: 83
End: 2023-07-13
Payer: MEDICARE

## 2023-07-13 VITALS
BODY MASS INDEX: 28.06 KG/M2 | DIASTOLIC BLOOD PRESSURE: 65 MMHG | OXYGEN SATURATION: 96 % | SYSTOLIC BLOOD PRESSURE: 107 MMHG | WEIGHT: 200.4 LBS | HEART RATE: 55 BPM | HEIGHT: 71 IN | RESPIRATION RATE: 16 BRPM

## 2023-07-13 DIAGNOSIS — M15.9 PRIMARY OSTEOARTHRITIS INVOLVING MULTIPLE JOINTS: ICD-10-CM

## 2023-07-13 DIAGNOSIS — F03.A0 MILD DEMENTIA WITHOUT BEHAVIORAL DISTURBANCE, PSYCHOTIC DISTURBANCE, MOOD DISTURBANCE, OR ANXIETY, UNSPECIFIED DEMENTIA TYPE (HCC): ICD-10-CM

## 2023-07-13 DIAGNOSIS — K29.30 CHRONIC SUPERFICIAL GASTRITIS WITHOUT BLEEDING: ICD-10-CM

## 2023-07-13 DIAGNOSIS — I48.92 ATRIAL FLUTTER, UNSPECIFIED TYPE (HCC): ICD-10-CM

## 2023-07-13 DIAGNOSIS — R26.81 UNSTEADY GAIT WHEN WALKING: ICD-10-CM

## 2023-07-13 DIAGNOSIS — I10 PRIMARY HYPERTENSION: Primary | ICD-10-CM

## 2023-07-13 DIAGNOSIS — N32.81 OVERACTIVE BLADDER: ICD-10-CM

## 2023-07-13 DIAGNOSIS — G70.00 MYASTHENIA GRAVIS (HCC): ICD-10-CM

## 2023-07-13 DIAGNOSIS — Z91.81 HISTORY OF FALLING, PRESENTING HAZARDS TO HEALTH: ICD-10-CM

## 2023-07-13 PROCEDURE — G8417 CALC BMI ABV UP PARAM F/U: HCPCS | Performed by: FAMILY MEDICINE

## 2023-07-13 PROCEDURE — 99349 HOME/RES VST EST MOD MDM 40: CPT | Performed by: FAMILY MEDICINE

## 2023-07-13 PROCEDURE — 3074F SYST BP LT 130 MM HG: CPT | Performed by: FAMILY MEDICINE

## 2023-07-13 PROCEDURE — 1123F ACP DISCUSS/DSCN MKR DOCD: CPT | Performed by: FAMILY MEDICINE

## 2023-07-13 PROCEDURE — 3078F DIAST BP <80 MM HG: CPT | Performed by: FAMILY MEDICINE

## 2023-07-13 PROCEDURE — 1036F TOBACCO NON-USER: CPT | Performed by: FAMILY MEDICINE

## 2023-07-25 LAB
BACTERIA, URINE: ABNORMAL
BILIRUBIN: NEGATIVE
BLOOD: ABNORMAL
CLARITY: ABNORMAL
COLOR: YELLOW
EPITHELIAL CELLS, UA: ABNORMAL
GLUCOSE: NEGATIVE
KETONES: NEGATIVE
LEUKOCYTE ESTERASE, URINE: ABNORMAL
NITRITE, URINE: NEGATIVE
PH, URINE: 7.5 (ref 4.5–8)
PROTEIN UA: NEGATIVE
RBC UA: ABNORMAL RBC/HPF (ref 0–2)
SPECIFIC GRAVITY UA: 1.01 (ref 1–1.03)
UROBILINOGEN, URINE: 0.2 E.U./DL (ref 0–1)
WBC URINE: ABNORMAL WBC/HPF (ref 0–5)

## 2023-07-26 LAB — URINE CULTURE, ROUTINE: NORMAL

## 2023-07-29 LAB
BACTERIA, URINE: ABNORMAL
BILIRUBIN: NEGATIVE
BLOOD: NEGATIVE
CALCIUM OXALATE CRYSTALS: ABNORMAL
CLARITY: ABNORMAL
COLOR: YELLOW
EPITHELIAL CELLS, UA: ABNORMAL
GLUCOSE: NEGATIVE
KETONES: ABNORMAL
LEUKOCYTE ESTERASE, URINE: ABNORMAL
MUCUS, URINE: ABNORMAL
NITRITE, URINE: NEGATIVE
PH, URINE: 6 (ref 4.5–8)
PROTEIN UA: NEGATIVE
RBC UA: ABNORMAL RBC/HPF (ref 0–2)
SPECIFIC GRAVITY UA: 1.02 (ref 1–1.03)
UROBILINOGEN, URINE: 1 E.U./DL (ref 0–1)
WBC URINE: ABNORMAL WBC/HPF (ref 0–5)

## 2023-07-30 LAB — URINE CULTURE, ROUTINE: NORMAL

## 2023-08-02 LAB — URINE CULTURE, ROUTINE: NORMAL

## 2023-08-05 LAB — URINE CULTURE, ROUTINE: ABNORMAL

## 2023-08-06 ENCOUNTER — TELEPHONE (OUTPATIENT)
Dept: PRIMARY CARE CLINIC | Age: 83
End: 2023-08-06

## 2023-08-06 RX ORDER — AMOXICILLIN AND CLAVULANATE POTASSIUM 875; 125 MG/1; MG/1
1 TABLET, FILM COATED ORAL 2 TIMES DAILY
Qty: 14 TABLET | Refills: 0 | Status: SHIPPED | OUTPATIENT
Start: 2023-08-06 | End: 2023-08-13